# Patient Record
Sex: FEMALE | Race: ASIAN | NOT HISPANIC OR LATINO | URBAN - METROPOLITAN AREA
[De-identification: names, ages, dates, MRNs, and addresses within clinical notes are randomized per-mention and may not be internally consistent; named-entity substitution may affect disease eponyms.]

---

## 2017-03-18 ENCOUNTER — EMERGENCY (EMERGENCY)
Facility: HOSPITAL | Age: 82
LOS: 1 days | Discharge: HOME | End: 2017-03-18
Attending: EMERGENCY MEDICINE
Payer: MEDICARE

## 2017-03-18 VITALS
DIASTOLIC BLOOD PRESSURE: 52 MMHG | HEART RATE: 73 BPM | RESPIRATION RATE: 16 BRPM | HEIGHT: 61.42 IN | SYSTOLIC BLOOD PRESSURE: 127 MMHG | TEMPERATURE: 98 F | WEIGHT: 110.01 LBS | OXYGEN SATURATION: 98 %

## 2017-03-18 VITALS
OXYGEN SATURATION: 100 % | HEART RATE: 81 BPM | SYSTOLIC BLOOD PRESSURE: 151 MMHG | TEMPERATURE: 97 F | RESPIRATION RATE: 16 BRPM | DIASTOLIC BLOOD PRESSURE: 70 MMHG

## 2017-03-18 DIAGNOSIS — M19.90 UNSPECIFIED OSTEOARTHRITIS, UNSPECIFIED SITE: ICD-10-CM

## 2017-03-18 DIAGNOSIS — I10 ESSENTIAL (PRIMARY) HYPERTENSION: ICD-10-CM

## 2017-03-18 DIAGNOSIS — R21 RASH AND OTHER NONSPECIFIC SKIN ERUPTION: ICD-10-CM

## 2017-03-18 PROCEDURE — 99283 EMERGENCY DEPT VISIT LOW MDM: CPT

## 2017-03-18 RX ORDER — DIPHENHYDRAMINE HCL 50 MG
25 CAPSULE ORAL ONCE
Qty: 0 | Refills: 0 | Status: COMPLETED | OUTPATIENT
Start: 2017-03-18 | End: 2017-03-18

## 2017-03-18 RX ORDER — FAMOTIDINE 10 MG/ML
1 INJECTION INTRAVENOUS
Qty: 14 | Refills: 0
Start: 2017-03-18 | End: 2017-03-25

## 2017-03-18 RX ORDER — DIPHENHYDRAMINE HCL 50 MG
1 CAPSULE ORAL
Qty: 20 | Refills: 0
Start: 2017-03-18 | End: 2017-03-25

## 2017-03-18 RX ORDER — FAMOTIDINE 10 MG/ML
20 INJECTION INTRAVENOUS ONCE
Qty: 0 | Refills: 0 | Status: COMPLETED | OUTPATIENT
Start: 2017-03-18 | End: 2017-03-18

## 2017-03-18 RX ADMIN — FAMOTIDINE 20 MILLIGRAM(S): 10 INJECTION INTRAVENOUS at 11:18

## 2017-03-18 RX ADMIN — Medication 20 MILLIGRAM(S): at 11:18

## 2017-03-18 RX ADMIN — Medication 25 MILLIGRAM(S): at 11:18

## 2017-03-18 NOTE — ED ADULT NURSE NOTE - OBJECTIVE STATEMENT
Pt accompanied by son in law who pt wishes to use for translation, reports dry red itchy scaly rash to body and head x 2 weeks.

## 2017-03-18 NOTE — ED PROVIDER NOTE - OBJECTIVE STATEMENT
81 year old with generalized hives for two weeks, given cream but did not helpt  no new meds or lotions, or soaps. no new foods gets monthly injectsion of arthriris. pt declined  phone, used family member

## 2017-03-22 PROBLEM — Z00.00 ENCOUNTER FOR PREVENTIVE HEALTH EXAMINATION: Status: ACTIVE | Noted: 2017-03-22

## 2018-04-18 ENCOUNTER — EMERGENCY (EMERGENCY)
Facility: HOSPITAL | Age: 83
LOS: 1 days | Discharge: HOME | End: 2018-04-18
Attending: EMERGENCY MEDICINE
Payer: MEDICARE

## 2018-04-18 VITALS
SYSTOLIC BLOOD PRESSURE: 189 MMHG | OXYGEN SATURATION: 95 % | TEMPERATURE: 98 F | HEIGHT: 62 IN | HEART RATE: 75 BPM | DIASTOLIC BLOOD PRESSURE: 100 MMHG | RESPIRATION RATE: 17 BRPM | WEIGHT: 117.95 LBS

## 2018-04-18 VITALS
OXYGEN SATURATION: 96 % | DIASTOLIC BLOOD PRESSURE: 78 MMHG | HEART RATE: 72 BPM | RESPIRATION RATE: 16 BRPM | SYSTOLIC BLOOD PRESSURE: 158 MMHG

## 2018-04-18 PROCEDURE — 73502 X-RAY EXAM HIP UNI 2-3 VIEWS: CPT

## 2018-04-18 PROCEDURE — 99283 EMERGENCY DEPT VISIT LOW MDM: CPT | Mod: 25

## 2018-04-18 PROCEDURE — 99284 EMERGENCY DEPT VISIT MOD MDM: CPT

## 2018-04-18 PROCEDURE — 73502 X-RAY EXAM HIP UNI 2-3 VIEWS: CPT | Mod: 26,LT

## 2018-04-18 RX ORDER — ACETAMINOPHEN 500 MG
650 TABLET ORAL ONCE
Qty: 0 | Refills: 0 | Status: COMPLETED | OUTPATIENT
Start: 2018-04-18 | End: 2018-04-18

## 2018-04-18 RX ADMIN — Medication 650 MILLIGRAM(S): at 13:40

## 2018-04-18 RX ADMIN — Medication 650 MILLIGRAM(S): at 13:10

## 2018-04-18 NOTE — ED PROVIDER NOTE - OBJECTIVE STATEMENT
Sara . 81 y/o F pt with PMHx of Arthritis and HTN and no significant PSHx BIB EMS to ED c/o mild L hip pain x2 days s/p mechanical fall on 04/16/2018. Pt states she had an X-Ray of the L hip performed, which was negative for fracture; pt has been ambulatory with a walker since the mechanical fall. Pt reports she does not want to be in the ED today, and says her insurance company called the ambulance en route to the ED. Pt denies fever, chills, CP, SOB, abdominal pain, focal weakness, paresthesias, LOC, or any other complaints. NKDA.

## 2018-04-18 NOTE — ED PROVIDER NOTE - MEDICAL DECISION MAKING DETAILS
83 y/o F pt presents with L hip pain s/p mechanical fall. No fracture. Will d/c home to f/u with orthopedics in x1 week.

## 2018-04-18 NOTE — ED PROVIDER NOTE - CHPI ED SYMPTOMS NEG
no fever/no LOC, no focal weakness, no paresthesias, no abdominal pain, no shortness of breath, no chest pain, no chills

## 2018-04-18 NOTE — ED PROVIDER NOTE - PROGRESS NOTE DETAILS
Attempted to call pt's daughter and other listed relative; no answer at either number Patient is resting comfortably, NAD. XR negative for fx. Patient asked me to call daughter to come pick her up. Gave multiple phone numbers. I called those numbers but all were incorrect. SEE paged. Patient's daughter in ED. Also seen by SEE, who arranged in-home PT. Daughter reports patient also fell last night, slid out of chair on to left hip again. Insurance did call ambulance. No head injury, LOC, cp, sob.

## 2018-04-18 NOTE — ED ADULT NURSE NOTE - CHPI ED SYMPTOMS NEG
no back pain/no abrasion/no numbness/no difficulty bearing weight/no weakness/no fever/no deformity/no tingling/no bruising

## 2020-03-14 ENCOUNTER — INPATIENT (INPATIENT)
Facility: HOSPITAL | Age: 85
LOS: 2 days | Discharge: HOME | DRG: 378 | End: 2020-03-17
Attending: INTERNAL MEDICINE | Admitting: INTERNAL MEDICINE
Payer: MEDICARE

## 2020-03-14 VITALS
HEIGHT: 60 IN | WEIGHT: 100.09 LBS | OXYGEN SATURATION: 100 % | TEMPERATURE: 98 F | HEART RATE: 70 BPM | SYSTOLIC BLOOD PRESSURE: 125 MMHG | DIASTOLIC BLOOD PRESSURE: 72 MMHG | RESPIRATION RATE: 20 BRPM

## 2020-03-14 DIAGNOSIS — K92.2 GASTROINTESTINAL HEMORRHAGE, UNSPECIFIED: ICD-10-CM

## 2020-03-14 PROBLEM — M19.90 UNSPECIFIED OSTEOARTHRITIS, UNSPECIFIED SITE: Chronic | Status: ACTIVE | Noted: 2017-03-18

## 2020-03-14 PROBLEM — I10 ESSENTIAL (PRIMARY) HYPERTENSION: Chronic | Status: ACTIVE | Noted: 2017-03-18

## 2020-03-14 LAB
ACANTHOCYTES BLD QL SMEAR: SLIGHT — SIGNIFICANT CHANGE UP
ALBUMIN SERPL ELPH-MCNC: 2.3 G/DL — LOW (ref 3.5–5)
ALP SERPL-CCNC: 67 U/L — SIGNIFICANT CHANGE UP (ref 40–120)
ALT FLD-CCNC: 16 U/L DA — SIGNIFICANT CHANGE UP (ref 10–60)
ANION GAP SERPL CALC-SCNC: 6 MMOL/L — SIGNIFICANT CHANGE UP (ref 5–17)
ANISOCYTOSIS BLD QL: SLIGHT — SIGNIFICANT CHANGE UP
AST SERPL-CCNC: 22 U/L — SIGNIFICANT CHANGE UP (ref 10–40)
BASOPHILS # BLD AUTO: 0 K/UL — SIGNIFICANT CHANGE UP (ref 0–0.2)
BASOPHILS NFR BLD AUTO: 0 % — SIGNIFICANT CHANGE UP (ref 0–2)
BILIRUB SERPL-MCNC: 0.2 MG/DL — SIGNIFICANT CHANGE UP (ref 0.2–1.2)
BLD GP AB SCN SERPL QL: SIGNIFICANT CHANGE UP
BUN SERPL-MCNC: 22 MG/DL — HIGH (ref 7–18)
CALCIUM SERPL-MCNC: 8 MG/DL — LOW (ref 8.4–10.5)
CHLORIDE SERPL-SCNC: 108 MMOL/L — SIGNIFICANT CHANGE UP (ref 96–108)
CO2 SERPL-SCNC: 23 MMOL/L — SIGNIFICANT CHANGE UP (ref 22–31)
CREAT SERPL-MCNC: 1.03 MG/DL — SIGNIFICANT CHANGE UP (ref 0.5–1.3)
ELLIPTOCYTES BLD QL SMEAR: SLIGHT — SIGNIFICANT CHANGE UP
EOSINOPHIL # BLD AUTO: 0.31 K/UL — SIGNIFICANT CHANGE UP (ref 0–0.5)
EOSINOPHIL NFR BLD AUTO: 4 % — SIGNIFICANT CHANGE UP (ref 0–6)
GLUCOSE SERPL-MCNC: 95 MG/DL — SIGNIFICANT CHANGE UP (ref 70–99)
HCT VFR BLD CALC: 16.5 % — CRITICAL LOW (ref 34.5–45)
HGB BLD-MCNC: 5.2 G/DL — CRITICAL LOW (ref 11.5–15.5)
HYPOCHROMIA BLD QL: SLIGHT — SIGNIFICANT CHANGE UP
IRON SATN MFR SERPL: 29 UG/DL — LOW (ref 40–150)
IRON SATN MFR SERPL: 8 % — LOW (ref 15–50)
LIDOCAIN IGE QN: 289 U/L — SIGNIFICANT CHANGE UP (ref 73–393)
LYMPHOCYTES # BLD AUTO: 1 K/UL — SIGNIFICANT CHANGE UP (ref 1–3.3)
LYMPHOCYTES # BLD AUTO: 13 % — SIGNIFICANT CHANGE UP (ref 13–44)
MANUAL SMEAR VERIFICATION: SIGNIFICANT CHANGE UP
MCHC RBC-ENTMCNC: 29.2 PG — SIGNIFICANT CHANGE UP (ref 27–34)
MCHC RBC-ENTMCNC: 31.5 GM/DL — LOW (ref 32–36)
MCV RBC AUTO: 92.7 FL — SIGNIFICANT CHANGE UP (ref 80–100)
MICROCYTES BLD QL: SLIGHT — SIGNIFICANT CHANGE UP
MONOCYTES # BLD AUTO: 0.69 K/UL — SIGNIFICANT CHANGE UP (ref 0–0.9)
MONOCYTES NFR BLD AUTO: 9 % — SIGNIFICANT CHANGE UP (ref 2–14)
NEUTROPHILS # BLD AUTO: 5.61 K/UL — SIGNIFICANT CHANGE UP (ref 1.8–7.4)
NEUTROPHILS NFR BLD AUTO: 72 % — SIGNIFICANT CHANGE UP (ref 43–77)
NEUTS BAND # BLD: 1 % — SIGNIFICANT CHANGE UP (ref 0–8)
NRBC # BLD: 0 /100 — SIGNIFICANT CHANGE UP (ref 0–0)
OB PNL STL: POSITIVE
PLAT MORPH BLD: NORMAL — SIGNIFICANT CHANGE UP
PLATELET # BLD AUTO: 272 K/UL — SIGNIFICANT CHANGE UP (ref 150–400)
POIKILOCYTOSIS BLD QL AUTO: SLIGHT — SIGNIFICANT CHANGE UP
POLYCHROMASIA BLD QL SMEAR: SLIGHT — SIGNIFICANT CHANGE UP
POTASSIUM SERPL-MCNC: 3.8 MMOL/L — SIGNIFICANT CHANGE UP (ref 3.5–5.3)
POTASSIUM SERPL-SCNC: 3.8 MMOL/L — SIGNIFICANT CHANGE UP (ref 3.5–5.3)
PROT SERPL-MCNC: 5.7 G/DL — LOW (ref 6–8.3)
RBC # BLD: 1.78 M/UL — LOW (ref 3.8–5.2)
RBC # BLD: 1.83 M/UL — LOW (ref 3.8–5.2)
RBC # FLD: 17.1 % — HIGH (ref 10.3–14.5)
RBC BLD AUTO: ABNORMAL
RETICS #: 80.5 K/UL — SIGNIFICANT CHANGE UP (ref 25–125)
RETICS/RBC NFR: 4.4 % — HIGH (ref 0.5–2.5)
SODIUM SERPL-SCNC: 137 MMOL/L — SIGNIFICANT CHANGE UP (ref 135–145)
SPHEROCYTES BLD QL SMEAR: SLIGHT — SIGNIFICANT CHANGE UP
TIBC SERPL-MCNC: 353 UG/DL — SIGNIFICANT CHANGE UP (ref 250–450)
TROPONIN I SERPL-MCNC: 0.03 NG/ML — SIGNIFICANT CHANGE UP (ref 0–0.04)
UIBC SERPL-MCNC: 324 UG/DL — SIGNIFICANT CHANGE UP (ref 110–370)
VARIANT LYMPHS # BLD: 1 % — SIGNIFICANT CHANGE UP (ref 0–6)
WBC # BLD: 7.69 K/UL — SIGNIFICANT CHANGE UP (ref 3.8–10.5)
WBC # FLD AUTO: 7.69 K/UL — SIGNIFICANT CHANGE UP (ref 3.8–10.5)

## 2020-03-14 PROCEDURE — 99285 EMERGENCY DEPT VISIT HI MDM: CPT

## 2020-03-14 PROCEDURE — 74177 CT ABD & PELVIS W/CONTRAST: CPT | Mod: 26

## 2020-03-14 PROCEDURE — 71045 X-RAY EXAM CHEST 1 VIEW: CPT | Mod: 26

## 2020-03-14 RX ORDER — PREDNISOLONE 5 MG
5 TABLET ORAL
Qty: 0 | Refills: 0 | DISCHARGE

## 2020-03-14 RX ORDER — ACETAMINOPHEN 500 MG
650 TABLET ORAL DAILY
Refills: 0 | Status: DISCONTINUED | OUTPATIENT
Start: 2020-03-14 | End: 2020-03-17

## 2020-03-14 RX ORDER — IOHEXOL 300 MG/ML
30 INJECTION, SOLUTION INTRAVENOUS ONCE
Refills: 0 | Status: COMPLETED | OUTPATIENT
Start: 2020-03-14 | End: 2020-03-14

## 2020-03-14 RX ORDER — FOLIC ACID 0.8 MG
1 TABLET ORAL DAILY
Refills: 0 | Status: DISCONTINUED | OUTPATIENT
Start: 2020-03-14 | End: 2020-03-17

## 2020-03-14 RX ORDER — CARVEDILOL PHOSPHATE 80 MG/1
1 CAPSULE, EXTENDED RELEASE ORAL
Qty: 0 | Refills: 0 | DISCHARGE

## 2020-03-14 RX ORDER — FOLIC ACID 0.8 MG
1 TABLET ORAL
Qty: 0 | Refills: 0 | DISCHARGE

## 2020-03-14 RX ORDER — CARVEDILOL PHOSPHATE 80 MG/1
3.12 CAPSULE, EXTENDED RELEASE ORAL EVERY 12 HOURS
Refills: 0 | Status: DISCONTINUED | OUTPATIENT
Start: 2020-03-14 | End: 2020-03-17

## 2020-03-14 RX ORDER — FUROSEMIDE 40 MG
20 TABLET ORAL ONCE
Refills: 0 | Status: COMPLETED | OUTPATIENT
Start: 2020-03-14 | End: 2020-03-14

## 2020-03-14 RX ORDER — SODIUM CHLORIDE 9 MG/ML
1000 INJECTION INTRAMUSCULAR; INTRAVENOUS; SUBCUTANEOUS ONCE
Refills: 0 | Status: COMPLETED | OUTPATIENT
Start: 2020-03-14 | End: 2020-03-14

## 2020-03-14 RX ORDER — SODIUM CHLORIDE 9 MG/ML
1000 INJECTION INTRAMUSCULAR; INTRAVENOUS; SUBCUTANEOUS
Refills: 0 | Status: DISCONTINUED | OUTPATIENT
Start: 2020-03-14 | End: 2020-03-15

## 2020-03-14 RX ORDER — PANTOPRAZOLE SODIUM 20 MG/1
80 TABLET, DELAYED RELEASE ORAL ONCE
Refills: 0 | Status: COMPLETED | OUTPATIENT
Start: 2020-03-14 | End: 2020-03-14

## 2020-03-14 RX ADMIN — Medication 20 MILLIGRAM(S): at 22:28

## 2020-03-14 RX ADMIN — SODIUM CHLORIDE 1000 MILLILITER(S): 9 INJECTION INTRAMUSCULAR; INTRAVENOUS; SUBCUTANEOUS at 16:45

## 2020-03-14 RX ADMIN — SODIUM CHLORIDE 125 MILLILITER(S): 9 INJECTION INTRAMUSCULAR; INTRAVENOUS; SUBCUTANEOUS at 17:54

## 2020-03-14 RX ADMIN — PANTOPRAZOLE SODIUM 80 MILLIGRAM(S): 20 TABLET, DELAYED RELEASE ORAL at 16:45

## 2020-03-14 RX ADMIN — IOHEXOL 30 MILLILITER(S): 300 INJECTION, SOLUTION INTRAVENOUS at 16:45

## 2020-03-14 NOTE — H&P ADULT - PROBLEM SELECTOR PLAN 1
GI bleed, occult +, not bleeding actively, HHA and daughter denies any blood in stool. Normal abd exam , good bowel sounds  DDx could be diverticular, haemorrhodial, AVM, peptic ulcer? hx iof gastritis ?  BP low to 70/50, s/p 2 L bolus--->improved   Hb 12, baseline 9--10, INR 1.49  Currently Hb stable, c/w fluids   Protonix 40mg iv bid  GI consult   CBC q6, keep Hb>7  NPO except medications  Hold anticoagulants, No chemoprophylaxis for DVT ppx. SCD for now.  Currently hemodynamically stable, BP improved s/p fluids to 93/54 MAP 64, not a candidate for ICU at this time, reconsult if required GI bleed, occult +, not bleeding actively, Grand daughter denies any blood in stool. Normal abd exam , good bowel sounds, pt is passing gas  Pt had BM in ED  DDx could be diverticular, haemorrhodial, AVM, Malignancy vs Eliquis    gastritis ?peptic ulcer? due to Meloxicam, Ibuprofen, Prednisone  CT scan is showing Gastritis vs Mass  Hb 5.9 s/p 2 units PRBC and 1 L of NS in ED   Currently Hb stable,   Started on Protonix 40mg iv bid  CBC q8, keep Hb>7  NPO except medications  Hold anticoagulants like Eliquis for now, No chemoprophylaxis for DVT ppx. SCD for now.  Avoid NSAIDS like Ketorolac and give tylenol for pain.  GI consult

## 2020-03-14 NOTE — ED PROVIDER NOTE - CLINICAL SUMMARY MEDICAL DECISION MAKING FREE TEXT BOX
Labs drawn     pt with bowel habit changes, stool appears to be dark, will get labs, CT A/P, reassess

## 2020-03-14 NOTE — ED PROVIDER NOTE - OBJECTIVE STATEMENT
84 y.o. female with h/o RA, pt's currently on Prednisone 5mg/day, pt c/o no BM for 3 days, no BM for 24 hrs., pt normally has BM daily, last colonoscopy neg @ age 81.  decreased appetite, pt's passing gas, No n/v, fever, abd pain

## 2020-03-14 NOTE — H&P ADULT - NSHPLABSRESULTS_GEN_ALL_CORE
LABS:                        5.2    7.69  )-----------( 272      ( 14 Mar 2020 17:00 )             16.5     03-14    137  |  108  |  22<H>  ----------------------------<  95  3.8   |  23  |  1.03    Ca    8.0<L>      14 Mar 2020 17:00    TPro  5.7<L>  /  Alb  2.3<L>  /  TBili  0.2  /  DBili  x   /  AST  22  /  ALT  16  /  AlkPhos  67  03-14        LIVER FUNCTIONS - ( 14 Mar 2020 17:00 )  Alb: 2.3 g/dL / Pro: 5.7 g/dL / ALK PHOS: 67 U/L / ALT: 16 U/L DA / AST: 22 U/L / GGT: x           Lipase, Serum: 289 U/L (03-14-20 @ 17:00)

## 2020-03-14 NOTE — H&P ADULT - ASSESSMENT
83 y/o.F Cantonese speaking, from home, ambulates with walker, lives with family with PMH of Dementia, RA, Afib (Eliquis), HTN presented to ED with no BM and not urinating since 3 days.  In ED vitals are stable  Labs are significant for Hb 5.2  FOBT Positive  In ED s/p 2 units of PRBC

## 2020-03-14 NOTE — H&P ADULT - HISTORY OF PRESENT ILLNESS
84 y.o.F Cantonese speaking, from home, ambulates with walker, lives with family with PMH of Dementia, RA, Afib (Eliquis), HTN presented to ED with no BM and not urinating since 3 days. Pt normally has BM daily. Spoke to the Grand daughter on phone.  Pt is not speaking much, has waxing and waning mentation. Pt is c/o Heart burn, abdominal pain, not eating, loss of appetite, weight loss of 10 pounds in a month. Pt had a fall last month and hurt her Rt leg. Her last colonoscopy was 10 yrs back and was normal.  Pt is taking Meloxicam, Ibuprofen for arthritic pain. Pt had Black colored stools, jet black, watery stools 3 weeks ago and was taking Ranitidine, Omeprazole which gave her relief and bleeding stopped. Pt was taking Amoxicillin for last month for unknown reason. Pt is not taking prednisone from January 2020 for RA. Patient denies cough, sputum production, fevers/chills/nausea/vomiting. No diarrhea.  GOC Full code

## 2020-03-14 NOTE — H&P ADULT - NSHPPHYSICALEXAM_GEN_ALL_CORE
Vital Signs Last 24 Hrs  T(C): 36.4 (14 Mar 2020 23:39), Max: 36.8 (14 Mar 2020 15:20)  T(F): 97.5 (14 Mar 2020 23:39), Max: 98.3 (14 Mar 2020 15:20)  HR: 63 (14 Mar 2020 23:39) (63 - 70)  BP: 141/74 (14 Mar 2020 23:39) (125/72 - 141/74)  BP(mean): --  RR: 17 (14 Mar 2020 23:39) (17 - 20)  SpO2: 98% (14 Mar 2020 23:39) (98% - 100%)  PHYSICAL EXAM:  GENERAL: NAD,  HEAD:  Atraumatic, Normocephalic  EYES:  conjunctiva and sclera clear  NECK: Supple, No JVD, Normal thyroid  CHEST/LUNG: Clear to auscultation. Clear to percussion bilaterally; No rales, rhonchi, wheezing, or rubs  HEART: Regular rate and rhythm; No murmurs, rubs, or gallops  ABDOMEN: Soft, Nontender, Nondistended; Bowel sounds present  NERVOUS SYSTEM:  Alert & Oriented X 2    EXTREMITIES:  2+ Peripheral Pulses, No clubbing, cyanosis, or edema  SKIN: warm dry

## 2020-03-14 NOTE — ED PROVIDER NOTE - MUSCULOSKELETAL, MLM
Spine appears kyphotic, range of motion is not limited, no muscle or joint tenderness, debra hands with deformity

## 2020-03-14 NOTE — H&P ADULT - PROBLEM SELECTOR PLAN 3
Pt was on Amlodipine 5mg but it is on hold as she is normotensive.  She is receiving 2 PRBC and IVF  Resume if needed.

## 2020-03-14 NOTE — H&P ADULT - PROBLEM SELECTOR PLAN 2
EKG showed showed Afib with rate controlled  A fib: Rate controlled on carvedilol, hold  Eliquis  CHADS2-VASc score is 4 (HTN +1, age+2, female 1), consistent with >4.8% annual risk of stroke if off systemic anticoagulation  ATRIA stroke risk 7, high risk for ischemic stroke  ATRIA Bleeding risk 6 high risk of bleeding  - Not starting on Anticoagulation as the pt is at risk of falls and GI Bleed   Tele monitoring,   Troponins on admission , f/u T2 and T3  f/u ECHO  c/w Carvedilol her home medication   Cardiology Consulted Dr. Doe

## 2020-03-14 NOTE — ED PROVIDER NOTE - CARE PLAN
Principal Discharge DX:	UGIB (upper gastrointestinal bleed) Principal Discharge DX:	UGIB (upper gastrointestinal bleed)  Secondary Diagnosis:	New onset atrial fibrillation

## 2020-03-14 NOTE — ED PROVIDER NOTE - PROGRESS NOTE DETAILS
pt with UGIB, will admit, case d/w Dr. BLAYNE Cavazos, will transfuse pt bladder scan done-17cc, no urinary retention, pt with UGIB, will admit, case d/w Dr. BLAYNE Cavazos, will transfuse pt pt with GIB, will not give anticoag. for A. fib

## 2020-03-15 DIAGNOSIS — M06.9 RHEUMATOID ARTHRITIS, UNSPECIFIED: ICD-10-CM

## 2020-03-15 DIAGNOSIS — I10 ESSENTIAL (PRIMARY) HYPERTENSION: ICD-10-CM

## 2020-03-15 DIAGNOSIS — M19.90 UNSPECIFIED OSTEOARTHRITIS, UNSPECIFIED SITE: ICD-10-CM

## 2020-03-15 DIAGNOSIS — E46 UNSPECIFIED PROTEIN-CALORIE MALNUTRITION: ICD-10-CM

## 2020-03-15 DIAGNOSIS — K92.2 GASTROINTESTINAL HEMORRHAGE, UNSPECIFIED: ICD-10-CM

## 2020-03-15 DIAGNOSIS — I48.91 UNSPECIFIED ATRIAL FIBRILLATION: ICD-10-CM

## 2020-03-15 DIAGNOSIS — Z29.9 ENCOUNTER FOR PROPHYLACTIC MEASURES, UNSPECIFIED: ICD-10-CM

## 2020-03-15 LAB
ALBUMIN SERPL ELPH-MCNC: 2.5 G/DL — LOW (ref 3.5–5)
ALP SERPL-CCNC: 74 U/L — SIGNIFICANT CHANGE UP (ref 40–120)
ALT FLD-CCNC: 22 U/L DA — SIGNIFICANT CHANGE UP (ref 10–60)
ANION GAP SERPL CALC-SCNC: 7 MMOL/L — SIGNIFICANT CHANGE UP (ref 5–17)
AST SERPL-CCNC: 27 U/L — SIGNIFICANT CHANGE UP (ref 10–40)
BASOPHILS # BLD AUTO: 0.04 K/UL — SIGNIFICANT CHANGE UP (ref 0–0.2)
BASOPHILS # BLD AUTO: 0.05 K/UL — SIGNIFICANT CHANGE UP (ref 0–0.2)
BASOPHILS # BLD AUTO: 0.05 K/UL — SIGNIFICANT CHANGE UP (ref 0–0.2)
BASOPHILS NFR BLD AUTO: 0.5 % — SIGNIFICANT CHANGE UP (ref 0–2)
BASOPHILS NFR BLD AUTO: 0.6 % — SIGNIFICANT CHANGE UP (ref 0–2)
BASOPHILS NFR BLD AUTO: 0.7 % — SIGNIFICANT CHANGE UP (ref 0–2)
BILIRUB SERPL-MCNC: 0.7 MG/DL — SIGNIFICANT CHANGE UP (ref 0.2–1.2)
BUN SERPL-MCNC: 12 MG/DL — SIGNIFICANT CHANGE UP (ref 7–18)
CALCIUM SERPL-MCNC: 7.7 MG/DL — LOW (ref 8.4–10.5)
CHLORIDE SERPL-SCNC: 108 MMOL/L — SIGNIFICANT CHANGE UP (ref 96–108)
CHOLEST SERPL-MCNC: 162 MG/DL — SIGNIFICANT CHANGE UP (ref 10–199)
CK MB BLD-MCNC: 2.4 % — SIGNIFICANT CHANGE UP (ref 0–3.5)
CK MB CFR SERPL CALC: 1.5 NG/ML — SIGNIFICANT CHANGE UP (ref 0–3.6)
CK SERPL-CCNC: 62 U/L — SIGNIFICANT CHANGE UP (ref 21–215)
CO2 SERPL-SCNC: 25 MMOL/L — SIGNIFICANT CHANGE UP (ref 22–31)
CREAT SERPL-MCNC: 0.86 MG/DL — SIGNIFICANT CHANGE UP (ref 0.5–1.3)
EOSINOPHIL # BLD AUTO: 0.19 K/UL — SIGNIFICANT CHANGE UP (ref 0–0.5)
EOSINOPHIL # BLD AUTO: 0.33 K/UL — SIGNIFICANT CHANGE UP (ref 0–0.5)
EOSINOPHIL # BLD AUTO: 0.4 K/UL — SIGNIFICANT CHANGE UP (ref 0–0.5)
EOSINOPHIL NFR BLD AUTO: 2.6 % — SIGNIFICANT CHANGE UP (ref 0–6)
EOSINOPHIL NFR BLD AUTO: 4.4 % — SIGNIFICANT CHANGE UP (ref 0–6)
EOSINOPHIL NFR BLD AUTO: 4.5 % — SIGNIFICANT CHANGE UP (ref 0–6)
ERYTHROCYTE [SEDIMENTATION RATE] IN BLOOD: 29 MM/HR — HIGH (ref 0–20)
FERRITIN SERPL-MCNC: 64 NG/ML — SIGNIFICANT CHANGE UP (ref 15–150)
FERRITIN SERPL-MCNC: 65 NG/ML — SIGNIFICANT CHANGE UP (ref 15–150)
FOLATE SERPL-MCNC: >20 NG/ML — SIGNIFICANT CHANGE UP
FOLATE SERPL-MCNC: >20 NG/ML — SIGNIFICANT CHANGE UP
GLUCOSE SERPL-MCNC: 83 MG/DL — SIGNIFICANT CHANGE UP (ref 70–99)
HAPTOGLOB SERPL-MCNC: 140 MG/DL — SIGNIFICANT CHANGE UP (ref 34–200)
HBA1C BLD-MCNC: 5.5 % — SIGNIFICANT CHANGE UP (ref 4–5.6)
HCT VFR BLD CALC: 28.7 % — LOW (ref 34.5–45)
HCT VFR BLD CALC: 29.7 % — LOW (ref 34.5–45)
HCT VFR BLD CALC: 30.2 % — LOW (ref 34.5–45)
HDLC SERPL-MCNC: 51 MG/DL — SIGNIFICANT CHANGE UP
HGB BLD-MCNC: 10 G/DL — LOW (ref 11.5–15.5)
HGB BLD-MCNC: 10.2 G/DL — LOW (ref 11.5–15.5)
HGB BLD-MCNC: 9.8 G/DL — LOW (ref 11.5–15.5)
IMM GRANULOCYTES NFR BLD AUTO: 0.8 % — SIGNIFICANT CHANGE UP (ref 0–1.5)
IMM GRANULOCYTES NFR BLD AUTO: 0.8 % — SIGNIFICANT CHANGE UP (ref 0–1.5)
IMM GRANULOCYTES NFR BLD AUTO: 0.9 % — SIGNIFICANT CHANGE UP (ref 0–1.5)
INR BLD: 1.13 RATIO — SIGNIFICANT CHANGE UP (ref 0.88–1.16)
LIPID PNL WITH DIRECT LDL SERPL: 83 MG/DL — SIGNIFICANT CHANGE UP
LYMPHOCYTES # BLD AUTO: 0.43 K/UL — LOW (ref 1–3.3)
LYMPHOCYTES # BLD AUTO: 0.44 K/UL — LOW (ref 1–3.3)
LYMPHOCYTES # BLD AUTO: 0.5 K/UL — LOW (ref 1–3.3)
LYMPHOCYTES # BLD AUTO: 5.7 % — LOW (ref 13–44)
LYMPHOCYTES # BLD AUTO: 5.7 % — LOW (ref 13–44)
LYMPHOCYTES # BLD AUTO: 6 % — LOW (ref 13–44)
MAGNESIUM SERPL-MCNC: 2.1 MG/DL — SIGNIFICANT CHANGE UP (ref 1.6–2.6)
MCHC RBC-ENTMCNC: 29.2 PG — SIGNIFICANT CHANGE UP (ref 27–34)
MCHC RBC-ENTMCNC: 29.3 PG — SIGNIFICANT CHANGE UP (ref 27–34)
MCHC RBC-ENTMCNC: 29.5 PG — SIGNIFICANT CHANGE UP (ref 27–34)
MCHC RBC-ENTMCNC: 33.7 GM/DL — SIGNIFICANT CHANGE UP (ref 32–36)
MCHC RBC-ENTMCNC: 33.8 GM/DL — SIGNIFICANT CHANGE UP (ref 32–36)
MCHC RBC-ENTMCNC: 34.1 GM/DL — SIGNIFICANT CHANGE UP (ref 32–36)
MCV RBC AUTO: 86.4 FL — SIGNIFICANT CHANGE UP (ref 80–100)
MCV RBC AUTO: 86.5 FL — SIGNIFICANT CHANGE UP (ref 80–100)
MCV RBC AUTO: 87.1 FL — SIGNIFICANT CHANGE UP (ref 80–100)
MONOCYTES # BLD AUTO: 0.64 K/UL — SIGNIFICANT CHANGE UP (ref 0–0.9)
MONOCYTES # BLD AUTO: 0.79 K/UL — SIGNIFICANT CHANGE UP (ref 0–0.9)
MONOCYTES # BLD AUTO: 0.82 K/UL — SIGNIFICANT CHANGE UP (ref 0–0.9)
MONOCYTES NFR BLD AUTO: 10.5 % — SIGNIFICANT CHANGE UP (ref 2–14)
MONOCYTES NFR BLD AUTO: 8.7 % — SIGNIFICANT CHANGE UP (ref 2–14)
MONOCYTES NFR BLD AUTO: 9.3 % — SIGNIFICANT CHANGE UP (ref 2–14)
NEUTROPHILS # BLD AUTO: 5.87 K/UL — SIGNIFICANT CHANGE UP (ref 1.8–7.4)
NEUTROPHILS # BLD AUTO: 5.95 K/UL — SIGNIFICANT CHANGE UP (ref 1.8–7.4)
NEUTROPHILS # BLD AUTO: 7 K/UL — SIGNIFICANT CHANGE UP (ref 1.8–7.4)
NEUTROPHILS NFR BLD AUTO: 78 % — HIGH (ref 43–77)
NEUTROPHILS NFR BLD AUTO: 79.1 % — HIGH (ref 43–77)
NEUTROPHILS NFR BLD AUTO: 81.2 % — HIGH (ref 43–77)
NRBC # BLD: 0 /100 WBCS — SIGNIFICANT CHANGE UP (ref 0–0)
PHOSPHATE SERPL-MCNC: 3 MG/DL — SIGNIFICANT CHANGE UP (ref 2.5–4.5)
PLATELET # BLD AUTO: 244 K/UL — SIGNIFICANT CHANGE UP (ref 150–400)
PLATELET # BLD AUTO: 255 K/UL — SIGNIFICANT CHANGE UP (ref 150–400)
PLATELET # BLD AUTO: 256 K/UL — SIGNIFICANT CHANGE UP (ref 150–400)
POTASSIUM SERPL-MCNC: 3 MMOL/L — LOW (ref 3.5–5.3)
POTASSIUM SERPL-SCNC: 3 MMOL/L — LOW (ref 3.5–5.3)
PROT SERPL-MCNC: 6 G/DL — SIGNIFICANT CHANGE UP (ref 6–8.3)
PROTHROM AB SERPL-ACNC: 12.8 SEC — SIGNIFICANT CHANGE UP (ref 10–12.9)
RBC # BLD: 3.32 M/UL — LOW (ref 3.8–5.2)
RBC # BLD: 3.41 M/UL — LOW (ref 3.8–5.2)
RBC # BLD: 3.49 M/UL — LOW (ref 3.8–5.2)
RBC # FLD: 15.2 % — HIGH (ref 10.3–14.5)
RBC # FLD: 15.9 % — HIGH (ref 10.3–14.5)
RBC # FLD: 16.1 % — HIGH (ref 10.3–14.5)
SODIUM SERPL-SCNC: 140 MMOL/L — SIGNIFICANT CHANGE UP (ref 135–145)
TOTAL CHOLESTEROL/HDL RATIO MEASUREMENT: 3.2 RATIO — LOW (ref 3.3–7.1)
TRANSFERRIN SERPL-MCNC: 249 MG/DL — SIGNIFICANT CHANGE UP (ref 200–360)
TRIGL SERPL-MCNC: 140 MG/DL — SIGNIFICANT CHANGE UP (ref 10–149)
TROPONIN I SERPL-MCNC: 0.06 NG/ML — HIGH (ref 0–0.04)
TSH SERPL-MCNC: 1.45 UU/ML — SIGNIFICANT CHANGE UP (ref 0.34–4.82)
VIT B12 SERPL-MCNC: 1953 PG/ML — HIGH (ref 232–1245)
VIT B12 SERPL-MCNC: 1998 PG/ML — HIGH (ref 232–1245)
WBC # BLD: 7.33 K/UL — SIGNIFICANT CHANGE UP (ref 3.8–10.5)
WBC # BLD: 7.53 K/UL — SIGNIFICANT CHANGE UP (ref 3.8–10.5)
WBC # BLD: 8.84 K/UL — SIGNIFICANT CHANGE UP (ref 3.8–10.5)
WBC # FLD AUTO: 7.33 K/UL — SIGNIFICANT CHANGE UP (ref 3.8–10.5)
WBC # FLD AUTO: 7.53 K/UL — SIGNIFICANT CHANGE UP (ref 3.8–10.5)
WBC # FLD AUTO: 8.84 K/UL — SIGNIFICANT CHANGE UP (ref 3.8–10.5)

## 2020-03-15 PROCEDURE — 70450 CT HEAD/BRAIN W/O DYE: CPT | Mod: 26

## 2020-03-15 RX ORDER — PANTOPRAZOLE SODIUM 20 MG/1
40 TABLET, DELAYED RELEASE ORAL
Refills: 0 | Status: DISCONTINUED | OUTPATIENT
Start: 2020-03-15 | End: 2020-03-15

## 2020-03-15 RX ORDER — POTASSIUM CHLORIDE 20 MEQ
10 PACKET (EA) ORAL
Refills: 0 | Status: COMPLETED | OUTPATIENT
Start: 2020-03-15 | End: 2020-03-15

## 2020-03-15 RX ORDER — PANTOPRAZOLE SODIUM 20 MG/1
8 TABLET, DELAYED RELEASE ORAL
Qty: 80 | Refills: 0 | Status: DISCONTINUED | OUTPATIENT
Start: 2020-03-15 | End: 2020-03-16

## 2020-03-15 RX ADMIN — CARVEDILOL PHOSPHATE 3.12 MILLIGRAM(S): 80 CAPSULE, EXTENDED RELEASE ORAL at 18:13

## 2020-03-15 RX ADMIN — PANTOPRAZOLE SODIUM 10 MG/HR: 20 TABLET, DELAYED RELEASE ORAL at 09:03

## 2020-03-15 RX ADMIN — CARVEDILOL PHOSPHATE 3.12 MILLIGRAM(S): 80 CAPSULE, EXTENDED RELEASE ORAL at 05:09

## 2020-03-15 RX ADMIN — PANTOPRAZOLE SODIUM 40 MILLIGRAM(S): 20 TABLET, DELAYED RELEASE ORAL at 05:11

## 2020-03-15 RX ADMIN — Medication 1 MILLIGRAM(S): at 11:30

## 2020-03-15 RX ADMIN — Medication 100 MILLIEQUIVALENT(S): at 14:00

## 2020-03-15 RX ADMIN — Medication 100 MILLIEQUIVALENT(S): at 15:11

## 2020-03-15 RX ADMIN — Medication 100 MILLIEQUIVALENT(S): at 12:45

## 2020-03-15 NOTE — PROGRESS NOTE ADULT - PROBLEM SELECTOR PLAN 1
GI bleed, occult +, not bleeding actively, Grand daughter denies any blood in stool. Normal abd exam , good bowel sounds, pt is passing gas  Pt had BM in ED  DDx could be diverticular, haemorrhodial, AVM, Malignancy vs Eliquis    gastritis ?peptic ulcer? due to Meloxicam, Ibuprofen, Prednisone  CT scan is showing Gastritis vs Mass  Hb 5.9 s/p 2 units PRBC and 1 L of NS in ED   Currently Hb stable,   Started on Protonix 40mg iv bid  CBC q8, keep Hb>7  Hold anticoagulants like Eliquis for now, No chemoprophylaxis for DVT ppx. SCD for now.  Avoid NSAIDS like Ketorolac and give tylenol for pain.  EGD tomorrow  npo after midnight  clear diet for now

## 2020-03-15 NOTE — PHYSICAL THERAPY INITIAL EVALUATION ADULT - ADDITIONAL COMMENTS
Patient lives in an apartment building with 3 steps in lobby which is not a concern due to presence of levator and ramp. Pt uses cane for house ambulation and walker/rollator in the community. Pt states she goes to her adult day care almost every day.

## 2020-03-15 NOTE — CONSULT NOTE ADULT - ASSESSMENT
UGIB   On NSAIDS  Clears today   NPO post midnight for EGD tomorrow   Monitor CBC   Keep hemoglobin around 8   PPI IV Pushes BID   Advanced care planning was discussed with patient and family.  Advanced care planning forms were reviewed and discussed.  Risks, benefits and alternatives of gastroenterologic procedures were discussed in detail and all questions were answered.

## 2020-03-15 NOTE — PHYSICAL THERAPY INITIAL EVALUATION ADULT - GENERAL OBSERVATIONS, REHAB EVAL
Patient was seen for PT evaluation today. EMR, laboratory and radiology results reviewed. Pt received supine in bed, NAD, IV on right hand in situ and nurse administering medication

## 2020-03-15 NOTE — PHYSICAL THERAPY INITIAL EVALUATION ADULT - CRITERIA FOR SKILLED THERAPEUTIC INTERVENTIONS
therapy frequency/anticipated discharge recommendation/risk reduction/prevention/rehab potential/predicted duration of therapy intervention/impairments found/functional limitations in following categories

## 2020-03-15 NOTE — PHYSICAL THERAPY INITIAL EVALUATION ADULT - DIAGNOSIS, PT EVAL
Patient with functional limitation due to decrease strength, decrease ambulation and OOB activities secondary to stomach pain and NPO

## 2020-03-15 NOTE — PHYSICAL THERAPY INITIAL EVALUATION ADULT - PERTINENT HX OF CURRENT PROBLEM, REHAB EVAL
84 y.o.F, home, ambulates with walker, lives with family with PMH of Dementia, RA, Afib (Eliquis), HTN. Admitted for gastrointestinal hemorrhage

## 2020-03-15 NOTE — CONSULT NOTE ADULT - SUBJECTIVE AND OBJECTIVE BOX
Patient is a 84y old  Female who presents with a chief complaint of Bleeding in stools. (14 Mar 2020 22:19)    84 y.o.F Cantonese speaking, from home, ambulates with walker, lives with family with PMH of Dementia, RA, Afib (Eliquis), HTN presented to ED with no BM and not urinating since 3 days. Pt normally has BM daily. Spoke to the Grand daughter on phone.  Pt is not speaking much, has waxing and waning mentation. Pt is c/o Heart burn, abdominal pain, not eating, loss of appetite, weight loss of 10 pounds in a month. Pt had a fall last month and hurt her Rt leg. Her last colonoscopy was 10 yrs back and was normal.  Pt is taking Meloxicam, Ibuprofen for arthritic pain. Pt had Black colored stools, jet black, watery stools 3 weeks ago and was taking Ranitidine, Omeprazole which gave her relief and bleeding stopped. Pt was taking Amoxicillin for last month for unknown reason. Pt is not taking prednisone from 2020 for RA. Patient denies cough, sputum production, fevers/chills/nausea/vomiting. No diarrhea.    REVIEW OF SYSTEMS  Constitutional:   No fever, no fatigue, no pallor, no night sweats, no weight loss.  HEENT:   No eye pain, no vision changes, no icterus, no mouth ulcers.  Respiratory:   No shortness of breath, no cough, no respiratory distress.   Cardiovascular:   No chest pain, no palpitations.   Gastrointestinal: No abdominal pain, no nausea, no vomiting , no diahrrea, no constipation, no hematochezia,no melena.  Skin:   No rashes, no jaundice, no eczema.   Musculoskeletal:   No joint pain, no swelling, no myalgia.   Neurologic:   No headache, no seizure, no weakness.   Genitourinary:   No dysuria, no decreased urine output.  Psychiatric:  No depression, no anxiety,   Endocrine:   No thyroid disease, no diabetes.  Heme/Lymphatic:   No anemia, no blood transfusions, no lymph node enlargement, no bleeding, no bruising.  ___________________________________________________________________________________________  Allergies    No Known Allergies    Intolerances      MEDICATIONS  (STANDING):  carvedilol 3.125 milliGRAM(s) Oral every 12 hours  folic acid 1 milliGRAM(s) Oral daily  pantoprazole Infusion 8 mG/Hr (10 mL/Hr) IV Continuous <Continuous>  potassium chloride  10 mEq/100 mL IVPB 10 milliEquivalent(s) IV Intermittent every 1 hour    MEDICATIONS  (PRN):  acetaminophen   Tablet .. 650 milliGRAM(s) Oral daily PRN Mild Pain (1 - 3)      PAST MEDICAL & SURGICAL HISTORY:  Atrial fibrillation  Rheumatoid arthritis  Hypertension  Arthritis  No significant past surgical history    FAMILY HISTORY:    Social History: No hsitory of : Tobacco use, IVDA, EToH  ______________________________________________________________________________________    PHYSICAL EXAM    Daily Height in cm: 152.4 (14 Mar 2020 15:20)    Daily Weight in k.2 (15 Mar 2020 04:56)  BMI: 19.5 (03-14 @ 15:20)  Change in Weight:  Vital Signs Last 24 Hrs  T(C): 36.8 (15 Mar 2020 04:56), Max: 36.8 (14 Mar 2020 15:20)  T(F): 98.2 (15 Mar 2020 04:56), Max: 98.3 (14 Mar 2020 15:20)  HR: 52 (15 Mar 2020 12:11) (52 - 81)  BP: 128/62 (15 Mar 2020 12:11) (118/66 - 141/74)  BP(mean): --  RR: 17 (15 Mar 2020 10:36) (17 - 20)  SpO2: 98% (15 Mar 2020 12:11) (96% - 100%)    General:  Well developed, well nourished, alert and active, no pallor, NAD.  HEENT:    Normal appearance of conjunctiva, ears, nose, lips, oropharynx, and oral mucosa, anicteric.  Neck:  No masses, no asymmetry.  Lymph Nodes:  No lymphadenopathy.   Cardiovascular:  RRR normal S1/S2, no murmur.  Respiratory:  CTA B/L, normal respiratory effort.   Abdominal:   soft, no masses or tenderness, normoactive BS, NT/ND, no HSM.  Extremities:   No clubbing or cyanosis, normal capillary refill, no edema.   Skin:   No rash, jaundice, lesions, eczema.   Musculoskeletal:  No joint swelling, erythema or tenderness.   Neuro: No focal deficits.   Other:   _______________________________________________________________________________________________  Lab Results:                          10.0   7.53  )-----------( 256      ( 15 Mar 2020 08:11 )             29.7     03-15    140  |  108  |  12  ----------------------------<  83  3.0<L>   |  25  |  0.86    Ca    7.7<L>      15 Mar 2020 08:11  Phos  3.0     03-15  Mg     2.1     -15    TPro  6.0  /  Alb  2.5<L>  /  TBili  0.7  /  DBili  x   /  AST  27  /  ALT  22  /  AlkPhos  74  03-15    LIVER FUNCTIONS - ( 15 Mar 2020 08:11 )  Alb: 2.5 g/dL / Pro: 6.0 g/dL / ALK PHOS: 74 U/L / ALT: 22 U/L DA / AST: 27 U/L / GGT: x           PT/INR - ( 15 Mar 2020 08:11 )   PT: 12.8 sec;   INR: 1.13 ratio           Triglycerides, Serum: 140 mg/dL (03-15 @ 08:11)  Sedimentation Rate, Erythrocyte: 29 mm/Hr (03-15 @ 08:11)    CARDIAC MARKERS ( 15 Mar 2020 08:11 )  0.055 ng/mL / x     / 62 U/L / x     / 1.5 ng/mL  CARDIAC MARKERS ( 14 Mar 2020 17:00 )  0.029 ng/mL / x     / x     / x     / x          Stool Results:          RADIOLOGY RESULTS:    SURGICAL PATHOLOGY:

## 2020-03-15 NOTE — PROGRESS NOTE ADULT - SUBJECTIVE AND OBJECTIVE BOX
PGY 1 Note discussed with supervising resident and primary attending    Patient is a 84y old  Female who presents with a chief complaint of Bleeding in stools. (15 Mar 2020 13:54)      INTERVAL HPI/OVERNIGHT EVENTS: offers no new complaints; current symptoms resolving    MEDICATIONS  (STANDING):  carvedilol 3.125 milliGRAM(s) Oral every 12 hours  folic acid 1 milliGRAM(s) Oral daily  pantoprazole Infusion 8 mG/Hr (10 mL/Hr) IV Continuous <Continuous>    MEDICATIONS  (PRN):  acetaminophen   Tablet .. 650 milliGRAM(s) Oral daily PRN Mild Pain (1 - 3)      __________________________________________________  REVIEW OF SYSTEMS:    CONSTITUTIONAL: No fever,   EYES: no acute visual disturbances  NECK: No pain or stiffness  RESPIRATORY: No cough; No shortness of breath  CARDIOVASCULAR: No chest pain, no palpitations  GASTROINTESTINAL: No pain. No nausea or vomiting; No diarrhea   NEUROLOGICAL: No headache or numbness, no tremors  MUSCULOSKELETAL: No joint pain, no muscle pain  GENITOURINARY: no dysuria, no frequency, no hesitancy  PSYCHIATRY: no depression , no anxiety  ALL OTHER  ROS negative        Vital Signs Last 24 Hrs  T(C): 36.2 (15 Mar 2020 17:39), Max: 36.8 (15 Mar 2020 04:56)  T(F): 97.2 (15 Mar 2020 17:39), Max: 98.2 (15 Mar 2020 04:56)  HR: 58 (15 Mar 2020 17:39) (48 - 81)  BP: 125/65 (15 Mar 2020 17:39) (118/66 - 141/74)  BP(mean): --  RR: 16 (15 Mar 2020 17:39) (16 - 18)  SpO2: 97% (15 Mar 2020 17:39) (96% - 98%)    ________________________________________________  PHYSICAL EXAM:  GENERAL: NAD  HEENT: Normocephalic;  conjunctivae and sclerae clear; moist mucous membranes;   NECK : supple  CHEST/LUNG: Clear to auscultation bilaterally with good air entry   HEART: S1 S2  regular; no murmurs, gallops or rubs  ABDOMEN: Soft, Nontender, Nondistended; Bowel sounds present  EXTREMITIES: no cyanosis; no edema; no calf tenderness  SKIN: warm and dry; no rash  NERVOUS SYSTEM:  Awake and alert; Oriented  to place, person and time ; no new deficits    _________________________________________________  LABS:                        10.2   8.84  )-----------( 255      ( 15 Mar 2020 14:42 )             30.2     03-15    140  |  108  |  12  ----------------------------<  83  3.0<L>   |  25  |  0.86    Ca    7.7<L>      15 Mar 2020 08:11  Phos  3.0     03-15  Mg     2.1     03-15    TPro  6.0  /  Alb  2.5<L>  /  TBili  0.7  /  DBili  x   /  AST  27  /  ALT  22  /  AlkPhos  74  03-15    PT/INR - ( 15 Mar 2020 08:11 )   PT: 12.8 sec;   INR: 1.13 ratio             CAPILLARY BLOOD GLUCOSE            RADIOLOGY & ADDITIONAL TESTS:    Imaging Personally Reviewed:  YES/NO    Consultant(s) Notes Reviewed:   YES/ No    Care Discussed with Consultants :     Plan of care was discussed with patient and /or primary care giver; all questions and concerns were addressed and care was aligned with patient's wishes.

## 2020-03-16 ENCOUNTER — RESULT REVIEW (OUTPATIENT)
Age: 85
End: 2020-03-16

## 2020-03-16 LAB
ALBUMIN SERPL ELPH-MCNC: 2.3 G/DL — LOW (ref 3.5–5)
ALP SERPL-CCNC: 79 U/L — SIGNIFICANT CHANGE UP (ref 40–120)
ALT FLD-CCNC: 20 U/L DA — SIGNIFICANT CHANGE UP (ref 10–60)
ANION GAP SERPL CALC-SCNC: 7 MMOL/L — SIGNIFICANT CHANGE UP (ref 5–17)
AST SERPL-CCNC: 27 U/L — SIGNIFICANT CHANGE UP (ref 10–40)
BASOPHILS # BLD AUTO: 0.05 K/UL — SIGNIFICANT CHANGE UP (ref 0–0.2)
BASOPHILS NFR BLD AUTO: 0.5 % — SIGNIFICANT CHANGE UP (ref 0–2)
BILIRUB SERPL-MCNC: 0.6 MG/DL — SIGNIFICANT CHANGE UP (ref 0.2–1.2)
BUN SERPL-MCNC: 9 MG/DL — SIGNIFICANT CHANGE UP (ref 7–18)
CALCIUM SERPL-MCNC: 7.5 MG/DL — LOW (ref 8.4–10.5)
CHLORIDE SERPL-SCNC: 106 MMOL/L — SIGNIFICANT CHANGE UP (ref 96–108)
CO2 SERPL-SCNC: 24 MMOL/L — SIGNIFICANT CHANGE UP (ref 22–31)
CREAT SERPL-MCNC: 0.69 MG/DL — SIGNIFICANT CHANGE UP (ref 0.5–1.3)
EOSINOPHIL # BLD AUTO: 0.22 K/UL — SIGNIFICANT CHANGE UP (ref 0–0.5)
EOSINOPHIL NFR BLD AUTO: 2.3 % — SIGNIFICANT CHANGE UP (ref 0–6)
GLUCOSE BLDC GLUCOMTR-MCNC: 101 MG/DL — HIGH (ref 70–99)
GLUCOSE BLDC GLUCOMTR-MCNC: 102 MG/DL — HIGH (ref 70–99)
GLUCOSE BLDC GLUCOMTR-MCNC: 114 MG/DL — HIGH (ref 70–99)
GLUCOSE BLDC GLUCOMTR-MCNC: 86 MG/DL — SIGNIFICANT CHANGE UP (ref 70–99)
GLUCOSE BLDC GLUCOMTR-MCNC: 89 MG/DL — SIGNIFICANT CHANGE UP (ref 70–99)
GLUCOSE BLDC GLUCOMTR-MCNC: 90 MG/DL — SIGNIFICANT CHANGE UP (ref 70–99)
GLUCOSE SERPL-MCNC: 78 MG/DL — SIGNIFICANT CHANGE UP (ref 70–99)
HCT VFR BLD CALC: 31 % — LOW (ref 34.5–45)
HGB BLD-MCNC: 10.3 G/DL — LOW (ref 11.5–15.5)
IMM GRANULOCYTES NFR BLD AUTO: 1 % — SIGNIFICANT CHANGE UP (ref 0–1.5)
LYMPHOCYTES # BLD AUTO: 0.54 K/UL — LOW (ref 1–3.3)
LYMPHOCYTES # BLD AUTO: 5.6 % — LOW (ref 13–44)
MAGNESIUM SERPL-MCNC: 2.1 MG/DL — SIGNIFICANT CHANGE UP (ref 1.6–2.6)
MCHC RBC-ENTMCNC: 28.6 PG — SIGNIFICANT CHANGE UP (ref 27–34)
MCHC RBC-ENTMCNC: 33.2 GM/DL — SIGNIFICANT CHANGE UP (ref 32–36)
MCV RBC AUTO: 86.1 FL — SIGNIFICANT CHANGE UP (ref 80–100)
MONOCYTES # BLD AUTO: 0.82 K/UL — SIGNIFICANT CHANGE UP (ref 0–0.9)
MONOCYTES NFR BLD AUTO: 8.6 % — SIGNIFICANT CHANGE UP (ref 2–14)
NEUTROPHILS # BLD AUTO: 7.85 K/UL — HIGH (ref 1.8–7.4)
NEUTROPHILS NFR BLD AUTO: 82 % — HIGH (ref 43–77)
NRBC # BLD: 0 /100 WBCS — SIGNIFICANT CHANGE UP (ref 0–0)
PHOSPHATE SERPL-MCNC: 1.9 MG/DL — LOW (ref 2.5–4.5)
PLATELET # BLD AUTO: 273 K/UL — SIGNIFICANT CHANGE UP (ref 150–400)
POTASSIUM SERPL-MCNC: 3.3 MMOL/L — LOW (ref 3.5–5.3)
POTASSIUM SERPL-SCNC: 3.3 MMOL/L — LOW (ref 3.5–5.3)
PROT SERPL-MCNC: 5.8 G/DL — LOW (ref 6–8.3)
RBC # BLD: 3.6 M/UL — LOW (ref 3.8–5.2)
RBC # FLD: 16.4 % — HIGH (ref 10.3–14.5)
SODIUM SERPL-SCNC: 137 MMOL/L — SIGNIFICANT CHANGE UP (ref 135–145)
TROPONIN I SERPL-MCNC: 0.04 NG/ML — SIGNIFICANT CHANGE UP (ref 0–0.04)
WBC # BLD: 9.58 K/UL — SIGNIFICANT CHANGE UP (ref 3.8–10.5)
WBC # FLD AUTO: 9.58 K/UL — SIGNIFICANT CHANGE UP (ref 3.8–10.5)

## 2020-03-16 PROCEDURE — 88305 TISSUE EXAM BY PATHOLOGIST: CPT | Mod: 26

## 2020-03-16 PROCEDURE — 88312 SPECIAL STAINS GROUP 1: CPT | Mod: 26

## 2020-03-16 RX ORDER — POTASSIUM CHLORIDE 20 MEQ
40 PACKET (EA) ORAL ONCE
Refills: 0 | Status: COMPLETED | OUTPATIENT
Start: 2020-03-16 | End: 2020-03-16

## 2020-03-16 RX ORDER — SODIUM,POTASSIUM PHOSPHATES 278-250MG
1 POWDER IN PACKET (EA) ORAL ONCE
Refills: 0 | Status: COMPLETED | OUTPATIENT
Start: 2020-03-16 | End: 2020-03-16

## 2020-03-16 RX ORDER — PANTOPRAZOLE SODIUM 20 MG/1
40 TABLET, DELAYED RELEASE ORAL
Refills: 0 | Status: DISCONTINUED | OUTPATIENT
Start: 2020-03-16 | End: 2020-03-17

## 2020-03-16 RX ADMIN — Medication 1 MILLIGRAM(S): at 13:45

## 2020-03-16 RX ADMIN — CARVEDILOL PHOSPHATE 3.12 MILLIGRAM(S): 80 CAPSULE, EXTENDED RELEASE ORAL at 18:01

## 2020-03-16 RX ADMIN — Medication 1 PACKET(S): at 09:04

## 2020-03-16 RX ADMIN — CARVEDILOL PHOSPHATE 3.12 MILLIGRAM(S): 80 CAPSULE, EXTENDED RELEASE ORAL at 06:33

## 2020-03-16 RX ADMIN — Medication 40 MILLIEQUIVALENT(S): at 09:04

## 2020-03-16 RX ADMIN — PANTOPRAZOLE SODIUM 40 MILLIGRAM(S): 20 TABLET, DELAYED RELEASE ORAL at 17:59

## 2020-03-16 NOTE — PROGRESS NOTE ADULT - ASSESSMENT
85 y/o.F Cantonese speaking, from home, ambulates with walker, lives with family with PMH of Dementia, RA, Afib (Eliquis), HTN presented to ED with no BM and not urinating since 3 days.  In ED vitals are stable  Labs are significant for Hb 5.2  FOBT Positive  In ED s/p 2 units of PRBC

## 2020-03-16 NOTE — CHART NOTE - NSCHARTNOTEFT_GEN_A_CORE
Upon Nutritional Assessment by the Registered Dietitian your patient was determined to meet criteria / has evidence of the following diagnosis/diagnoses:          [ ]  Mild Protein Calorie Malnutrition        [ X ]  Moderate Protein Calorie Malnutrition        [ ] Severe Protein Calorie Malnutrition        [ ] Unspecified Protein Calorie Malnutrition        [ ] Underweight / BMI <19        [ ] Morbid Obesity / BMI > 40      Findings as based on:  •  Comprehensive nutrition assessment and consultation  •  Calorie counts (nutrient intake analysis)  •  Food acceptance and intake status from observations by staff  •  Follow up  •  Patient education  •  Intervention secondary to interdisciplinary rounds  •   concerns      Treatment:    The following diet has been recommended: Advance diet or consider alternate nutrition support if NPO prolonged further       PROVIDER Section:     By signing this assessment you are acknowledging and agree with the diagnosis/diagnoses assigned by the Registered Dietitian    Comments:

## 2020-03-16 NOTE — DIETITIAN INITIAL EVALUATION ADULT. - OTHER INFO
Pt alert, oriented, native Chinese-speaking RD able to communicate with pt in Chinese, well-communicated, lives home with family, HHA help PTA; decreased intake x 1m, persistent decreasing wt x 2y from usual go=495 lb to 107 lb 3m ago to current sy=069 lb upon admission 3/14/2020; diarrhea x 1d PTA, resolved, denied GI distress, chewing or swallowing problem at present, no specific food choices reported for when diet advanced, NPO/Clear Liquid diet x 2 to 3d in-house, NPO at present pending Endoscopy, s/p GI consult; Discussed with RN

## 2020-03-16 NOTE — CHART NOTE - NSCHARTNOTEFT_GEN_A_CORE
Esophagogastroduodenoscopy Report  Indication: Dysphagia    Referring MD:   Instrument:  #   Anesthesia: MAC  Consent:  Informed consent was obtained from the patient after providing any opportunity for questions  Procedure: The gastroscope was gently passed through the incisoral orifice into the oral cavity and under direct visualization the esophagus was intubated. The endoscope was passed down the esophagus, through the stomach and into proximal duodenum Color, texture, mucosa and anatomy of the esophagus, stomach, and duodenum were carefully examined with the scope. The patient tolerated the procedure well. After completion of the examination, the patient was transferred to the recovery room.   Preparation: NPO   Findings:   Oropharynx	Normal  Esophagus	Normal   EG-junction	Normal appearing z-line.   Cardia	Normal.  Body	Multiple small clean based ulcers. Biopsy taken [2]  Antrum	Large 2.5 cm clean based ulcer with no evidence of bleeding. Biopsy at the edge of the ulcer taken [1]  Pylorus	Normal  Duodenal Bulb	Normal  2nd portion	Normal  3rd portion	Normal  Date and time:3/16/2020 12:01:23 PM  EBL:0  Impression: 1- Large antral ulcer 2- Multiple small ulcers throughout the body     Plan: 1- Resume diet 2- PPI BID 3- Monitor CBC 4- Follow up pathology and treat H. pylori IF positive 5- Repeat EGD in 2-3 months to assess healing .                           Procedure Start Time:     Procedure End Time:                                       Attending:       Reece Poe M.D.   Date and Time: 3/16/2020 12:01:23 PM

## 2020-03-16 NOTE — DIETITIAN INITIAL EVALUATION ADULT. - REASON INDICATOR FOR ASSESSMENT
Nutrition consult requested for significant decrease of oral intake > 5d prior to admit and assessment

## 2020-03-16 NOTE — DIETITIAN INITIAL EVALUATION ADULT. - PERTINENT LABORATORY DATA
03-16 Na137 mmol/L Glu 78 mg/dL K+ 3.3 mmol/L<L> Cr  0.69 mg/dL BUN 9 mg/dL   03-16 Phos 1.9 mg/dL<L>   03-16 Alb 2.3 g/dL<L>     03-15 [id number] 5.5 %   03-15 Chol 162 mg/dL LDL 83 mg/dL HDL 51 mg/dL Trig 140 mg/dL

## 2020-03-16 NOTE — DIETITIAN INITIAL EVALUATION ADULT. - PROBLEM SELECTOR PROBLEM 3
Subjective:      Connor Marie is a 76 y.o. male who was referred by Dr LENY Diaz for evaluation of urothelial cancer.    The patient saw me many years ago at [facility].  These old records are not available at this time.  He tells me that he underwent a right laparoscopic nephroureterectomy by Dr. Balta Street at [facility] in September 2014.  He did very well with the surgery.  He tells me that he developed a recurrence in the bladder postoperatively.  Dr. Starks treated this with resection and induction BCG (6 weekly doses) followed by 2 cycles of 3 dose BCG maintenance.    Since then he has followed up with Dr. Papa Diaz.  He has had 4 positive bladder biopsies of the last year.  During this time.  He had induction with 4 weekly doses of BCG followed by 8 doses of BCG once monthly.    Dr. Diaz called me and is concerned that he may have a high-grade invasive tumor at the bladder neck/prostate.  He is asked me to reevaluate him and consider if additional treatment is necessary.         The following portions of the patient's history were reviewed and updated as appropriate: allergies, current medications, past family history, past medical history, past social history, past surgical history and problem list.    Review of Systems  Constitutional: no fever or chills  ENT: no nasal congestion or sore throat  Respiratory: no cough or shortness of breath  Cardiovascular: no chest pain or palpitations  Gastrointestinal: no nausea or vomiting, tolerating diet  Genitourinary: as per HPI  Hematologic/Lymphatic: no easy bruising or lymphadenopathy  Musculoskeletal: no arthralgias or myalgias  Neurological: no seizures or tremors  Behavioral/Psych: no auditory or visual hallucinations,  History of significant depression.  He was previously on Wellbutrin but this causes aggressiveness.  He also has chronic pain for which she is undergoing injections     Objective:   Vitals: /77 (BP Location: Right arm, Patient  "Position: Sitting, BP Method: Automatic)   Pulse 74   Ht 5' 11" (1.803 m)   Wt 93 kg (205 lb)   BMI 28.59 kg/m²     Physical Exam   General: alert and oriented, no acute distress  Head: normocephalic, atraumatic  Neck: supple, no lymphadenopathy, normal ROM, no masses  Respiratory: clear to auscultation, no wheezes or rales and Symmetric expansion, non-labored breathing  Cardiovascular: regular rate and rhythm, no murmurs, nomal pulses, no peripheral edema  Abdomen:  Well-healed periumbilical extraction site incision and soft, non tender, non distended, no palpable masses, no hernias, no hepatomegaly or splenomegaly  Lymphatic: no inguinal nodes  Skin: normal coloration and turgor, no rashes, no suspicious skin lesions noted  Neuro:  and alert and oriented x3, no gross deficits  Psych: Somewhat emotional and tearful during the examination    Physical Exam    Lab Review   Urinalysis demonstrates nitrite negative positive white cells and positive red blood cells    Pathology  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 Diaz this was at the bladder neck)    May 8, 2017 low-grade TA (per my conversation with Dr. Diaz this was also at the bladder neck    No results found for: WBC, HGB, HCT, MCV, PLT  No results found for: CREATININE, BUN  No results found for: PSA  Imaging  None available for review at this time  Assessment:     1. 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 Abnormal finding in urine       -  Plan:     Orders Placed This Encounter    Urine culture    X-Ray Chest PA And Lateral    POCT URINE DIPSTICK WITHOUT MICROSCOPE     Schedule examination under anesthesia with staging TURP plus minus bladder biopsy and retrograde pyelogram  We discussed the risk and benefits of the above with the patient and I answered all his " questions.    60 min face to face; more than 50% time spent counseling and coordinating care     Hypertension, unspecified type

## 2020-03-16 NOTE — PROGRESS NOTE ADULT - PROBLEM SELECTOR PLAN 2
EKG showed showed Afib with rate controlled  A fib: Rate controlled on carvedilol, hold  Eliquis  CHADS2-VASc score is 4 (HTN +1, age+2, female 1), consistent with >4.8% annual risk of stroke if off systemic anticoagulation  ATRIA stroke risk 7, high risk for ischemic stroke  ATRIA Bleeding risk 6 high risk of bleeding  - Not starting on Anticoagulation as the pt is at risk of falls and GI Bleed   Troponins on admission trended down  c/w Carvedilol her home medication  She is at high risk for bleeding, so AC is being held  echo showed ef 55%. mild pul htn'     Cardiology Consulted Dr. Doe

## 2020-03-16 NOTE — DIETITIAN INITIAL EVALUATION ADULT. - FACTORS AFF FOOD INTAKE
acute on chronic comorbidities/persistent lack of appetite/Temple/ethnic/cultural/personal food preferences

## 2020-03-16 NOTE — DIETITIAN INITIAL EVALUATION ADULT. - NS FNS WEIGHT USED FOR CALC
ideal/Ht=5'    AEI=677 lb   Admission lg=937 lb    BMI=19.3   Current wt=99.6 lb 3/14/2020; 101.6 lb 3/16/2020

## 2020-03-16 NOTE — PROGRESS NOTE ADULT - SUBJECTIVE AND OBJECTIVE BOX
PGY 1 Note discussed with supervising resident and primary attending    Patient is a 84y old  Female who presents with a chief complaint of Bleeding in stools. (15 Mar 2020 17:46)      INTERVAL HPI/OVERNIGHT EVENTS:  Patient had endoscopy and was found to have large antral ulcer, multipple small ulcers in the body. Dr Dejesus recommended for observation now. No AC as she is at high risk for bleeding.      MEDICATIONS  (STANDING):  carvedilol 3.125 milliGRAM(s) Oral every 12 hours  folic acid 1 milliGRAM(s) Oral daily  pantoprazole  Injectable 40 milliGRAM(s) IV Push two times a day    MEDICATIONS  (PRN):  acetaminophen   Tablet .. 650 milliGRAM(s) Oral daily PRN Mild Pain (1 - 3)      __________________________________________________  REVIEW OF SYSTEMS:    CONSTITUTIONAL: No fever,   EYES: no acute visual disturbances  NECK: No pain or stiffness  RESPIRATORY: No cough; No shortness of breath  CARDIOVASCULAR: No chest pain, no palpitations  GASTROINTESTINAL: No pain. No nausea or vomiting; No diarrhea   NEUROLOGICAL: No headache or numbness, no tremors  MUSCULOSKELETAL: No joint pain, no muscle pain  GENITOURINARY: no dysuria, no frequency, no hesitancy  PSYCHIATRY: no depression , no anxiety  ALL OTHER  ROS negative        Vital Signs Last 24 Hrs  T(C): 36.6 (16 Mar 2020 10:46), Max: 37.4 (16 Mar 2020 05:02)  T(F): 97.8 (16 Mar 2020 10:46), Max: 99.3 (16 Mar 2020 05:02)  HR: 60 (16 Mar 2020 11:24) (48 - 93)  BP: 148/80 (16 Mar 2020 11:24) (118/52 - 151/78)  BP(mean): --  RR: 17 (16 Mar 2020 10:46) (16 - 18)  SpO2: 99% (16 Mar 2020 11:24) (95% - 99%)    ________________________________________________  PHYSICAL EXAM:  GENERAL: NAD  HEENT: Normocephalic;  conjunctivae and sclerae clear; moist mucous membranes;   NECK : supple  CHEST/LUNG: Clear to auscultation bilaterally with good air entry   HEART: S1 S2  regular; no murmurs, gallops or rubs  ABDOMEN: Soft, Nontender, Nondistended; Bowel sounds present  EXTREMITIES: no cyanosis; no edema; no calf tenderness  SKIN: warm and dry; no rash  NERVOUS SYSTEM:  Awake and alert; Oriented  to place, person and time ; no new deficits    _________________________________________________  LABS:                        10.3   9.58  )-----------( 273      ( 16 Mar 2020 06:41 )             31.0     03-16    137  |  106  |  9   ----------------------------<  78  3.3<L>   |  24  |  0.69    Ca    7.5<L>      16 Mar 2020 06:41  Phos  1.9     03-16  Mg     2.1     03-16    TPro  5.8<L>  /  Alb  2.3<L>  /  TBili  0.6  /  DBili  x   /  AST  27  /  ALT  20  /  AlkPhos  79  03-16    PT/INR - ( 15 Mar 2020 08:11 )   PT: 12.8 sec;   INR: 1.13 ratio             CAPILLARY BLOOD GLUCOSE      POCT Blood Glucose.: 86 mg/dL (16 Mar 2020 12:46)  POCT Blood Glucose.: 102 mg/dL (16 Mar 2020 08:18)  POCT Blood Glucose.: 90 mg/dL (16 Mar 2020 06:01)  POCT Blood Glucose.: 89 mg/dL (16 Mar 2020 00:30)        RADIOLOGY & ADDITIONAL TESTS:    Imaging Personally Reviewed:  YES/NO    Consultant(s) Notes Reviewed:   YES/ No    Care Discussed with Consultants :     Plan of care was discussed with patient and /or primary care giver; all questions and concerns were addressed and care was aligned with patient's wishes.

## 2020-03-16 NOTE — DIETITIAN INITIAL EVALUATION ADULT. - PERTINENT MEDS FT
MEDICATIONS  (STANDING):  carvedilol 3.125 milliGRAM(s) Oral every 12 hours  folic acid 1 milliGRAM(s) Oral daily  pantoprazole Infusion 8 mG/Hr (10 mL/Hr) IV Continuous <Continuous>  potassium chloride   Powder 40 milliEquivalent(s) Oral once  potassium phosphate / sodium phosphate powder 1 Packet(s) Oral once

## 2020-03-16 NOTE — PROGRESS NOTE ADULT - PROBLEM SELECTOR PLAN 1
GI bleed, occult +, not bleeding actively, Grand daughter denies any blood in stool. Normal abd exam , good bowel sounds, pt is passing gas   Endoscopy was done today which showed large antral ulcer and multiple body ulcers, not actively bleeding. Biopsy was taken.  Dr Dejesus recommended not to start AC as she is at high risk for bleeding.   Started on regular diet  Will observe her for tonight.  f/u cbc

## 2020-03-17 ENCOUNTER — TRANSCRIPTION ENCOUNTER (OUTPATIENT)
Age: 85
End: 2020-03-17

## 2020-03-17 VITALS
OXYGEN SATURATION: 97 % | TEMPERATURE: 98 F | SYSTOLIC BLOOD PRESSURE: 147 MMHG | DIASTOLIC BLOOD PRESSURE: 84 MMHG | RESPIRATION RATE: 18 BRPM | HEART RATE: 55 BPM

## 2020-03-17 LAB
ANION GAP SERPL CALC-SCNC: 5 MMOL/L — SIGNIFICANT CHANGE UP (ref 5–17)
BUN SERPL-MCNC: 7 MG/DL — SIGNIFICANT CHANGE UP (ref 7–18)
CALCIUM SERPL-MCNC: 7.7 MG/DL — LOW (ref 8.4–10.5)
CHLORIDE SERPL-SCNC: 109 MMOL/L — HIGH (ref 96–108)
CO2 SERPL-SCNC: 23 MMOL/L — SIGNIFICANT CHANGE UP (ref 22–31)
CREAT SERPL-MCNC: 0.76 MG/DL — SIGNIFICANT CHANGE UP (ref 0.5–1.3)
GLUCOSE BLDC GLUCOMTR-MCNC: 106 MG/DL — HIGH (ref 70–99)
GLUCOSE BLDC GLUCOMTR-MCNC: 110 MG/DL — HIGH (ref 70–99)
GLUCOSE SERPL-MCNC: 85 MG/DL — SIGNIFICANT CHANGE UP (ref 70–99)
HCT VFR BLD CALC: 31.2 % — LOW (ref 34.5–45)
HGB BLD-MCNC: 10.5 G/DL — LOW (ref 11.5–15.5)
MCHC RBC-ENTMCNC: 29.8 PG — SIGNIFICANT CHANGE UP (ref 27–34)
MCHC RBC-ENTMCNC: 33.7 GM/DL — SIGNIFICANT CHANGE UP (ref 32–36)
MCV RBC AUTO: 88.6 FL — SIGNIFICANT CHANGE UP (ref 80–100)
NRBC # BLD: 0 /100 WBCS — SIGNIFICANT CHANGE UP (ref 0–0)
PLATELET # BLD AUTO: 272 K/UL — SIGNIFICANT CHANGE UP (ref 150–400)
POTASSIUM SERPL-MCNC: 3.8 MMOL/L — SIGNIFICANT CHANGE UP (ref 3.5–5.3)
POTASSIUM SERPL-SCNC: 3.8 MMOL/L — SIGNIFICANT CHANGE UP (ref 3.5–5.3)
RBC # BLD: 3.52 M/UL — LOW (ref 3.8–5.2)
RBC # FLD: 16.5 % — HIGH (ref 10.3–14.5)
SODIUM SERPL-SCNC: 137 MMOL/L — SIGNIFICANT CHANGE UP (ref 135–145)
WBC # BLD: 7.83 K/UL — SIGNIFICANT CHANGE UP (ref 3.8–10.5)
WBC # FLD AUTO: 7.83 K/UL — SIGNIFICANT CHANGE UP (ref 3.8–10.5)

## 2020-03-17 PROCEDURE — 97110 THERAPEUTIC EXERCISES: CPT

## 2020-03-17 PROCEDURE — 82550 ASSAY OF CK (CPK): CPT

## 2020-03-17 PROCEDURE — 83010 ASSAY OF HAPTOGLOBIN QUANT: CPT

## 2020-03-17 PROCEDURE — 83735 ASSAY OF MAGNESIUM: CPT

## 2020-03-17 PROCEDURE — 86900 BLOOD TYPING SEROLOGIC ABO: CPT

## 2020-03-17 PROCEDURE — 96374 THER/PROPH/DIAG INJ IV PUSH: CPT

## 2020-03-17 PROCEDURE — 36430 TRANSFUSION BLD/BLD COMPNT: CPT

## 2020-03-17 PROCEDURE — 82553 CREATINE MB FRACTION: CPT

## 2020-03-17 PROCEDURE — 86901 BLOOD TYPING SEROLOGIC RH(D): CPT

## 2020-03-17 PROCEDURE — 82607 VITAMIN B-12: CPT

## 2020-03-17 PROCEDURE — 85610 PROTHROMBIN TIME: CPT

## 2020-03-17 PROCEDURE — 86850 RBC ANTIBODY SCREEN: CPT

## 2020-03-17 PROCEDURE — 82272 OCCULT BLD FECES 1-3 TESTS: CPT

## 2020-03-17 PROCEDURE — 93306 TTE W/DOPPLER COMPLETE: CPT

## 2020-03-17 PROCEDURE — 71045 X-RAY EXAM CHEST 1 VIEW: CPT

## 2020-03-17 PROCEDURE — 97530 THERAPEUTIC ACTIVITIES: CPT

## 2020-03-17 PROCEDURE — 84484 ASSAY OF TROPONIN QUANT: CPT

## 2020-03-17 PROCEDURE — 82728 ASSAY OF FERRITIN: CPT

## 2020-03-17 PROCEDURE — P9040: CPT

## 2020-03-17 PROCEDURE — 97116 GAIT TRAINING THERAPY: CPT

## 2020-03-17 PROCEDURE — 97162 PT EVAL MOD COMPLEX 30 MIN: CPT

## 2020-03-17 PROCEDURE — 93005 ELECTROCARDIOGRAM TRACING: CPT

## 2020-03-17 PROCEDURE — 83690 ASSAY OF LIPASE: CPT

## 2020-03-17 PROCEDURE — 86923 COMPATIBILITY TEST ELECTRIC: CPT

## 2020-03-17 PROCEDURE — 80061 LIPID PANEL: CPT

## 2020-03-17 PROCEDURE — 88312 SPECIAL STAINS GROUP 1: CPT

## 2020-03-17 PROCEDURE — 80053 COMPREHEN METABOLIC PANEL: CPT

## 2020-03-17 PROCEDURE — 84100 ASSAY OF PHOSPHORUS: CPT

## 2020-03-17 PROCEDURE — 82962 GLUCOSE BLOOD TEST: CPT

## 2020-03-17 PROCEDURE — 74177 CT ABD & PELVIS W/CONTRAST: CPT

## 2020-03-17 PROCEDURE — 99285 EMERGENCY DEPT VISIT HI MDM: CPT

## 2020-03-17 PROCEDURE — 85027 COMPLETE CBC AUTOMATED: CPT

## 2020-03-17 PROCEDURE — 83036 HEMOGLOBIN GLYCOSYLATED A1C: CPT

## 2020-03-17 PROCEDURE — 80048 BASIC METABOLIC PNL TOTAL CA: CPT

## 2020-03-17 PROCEDURE — 85652 RBC SED RATE AUTOMATED: CPT

## 2020-03-17 PROCEDURE — 85045 AUTOMATED RETICULOCYTE COUNT: CPT

## 2020-03-17 PROCEDURE — 88305 TISSUE EXAM BY PATHOLOGIST: CPT

## 2020-03-17 PROCEDURE — 36415 COLL VENOUS BLD VENIPUNCTURE: CPT

## 2020-03-17 PROCEDURE — 82746 ASSAY OF FOLIC ACID SERUM: CPT

## 2020-03-17 PROCEDURE — 83550 IRON BINDING TEST: CPT

## 2020-03-17 PROCEDURE — 84443 ASSAY THYROID STIM HORMONE: CPT

## 2020-03-17 PROCEDURE — 70450 CT HEAD/BRAIN W/O DYE: CPT

## 2020-03-17 PROCEDURE — 84466 ASSAY OF TRANSFERRIN: CPT

## 2020-03-17 PROCEDURE — 83540 ASSAY OF IRON: CPT

## 2020-03-17 RX ORDER — MELOXICAM 15 MG/1
1 TABLET ORAL
Qty: 0 | Refills: 0 | DISCHARGE

## 2020-03-17 RX ORDER — AMLODIPINE BESYLATE 2.5 MG/1
1 TABLET ORAL
Qty: 0 | Refills: 0 | DISCHARGE

## 2020-03-17 RX ORDER — PANTOPRAZOLE SODIUM 20 MG/1
1 TABLET, DELAYED RELEASE ORAL
Qty: 60 | Refills: 0
Start: 2020-03-17 | End: 2020-04-15

## 2020-03-17 RX ORDER — APIXABAN 2.5 MG/1
1 TABLET, FILM COATED ORAL
Qty: 0 | Refills: 0 | DISCHARGE

## 2020-03-17 RX ORDER — PANTOPRAZOLE SODIUM 20 MG/1
40 TABLET, DELAYED RELEASE ORAL ONCE
Refills: 0 | Status: COMPLETED | OUTPATIENT
Start: 2020-03-17 | End: 2020-03-17

## 2020-03-17 RX ADMIN — PANTOPRAZOLE SODIUM 40 MILLIGRAM(S): 20 TABLET, DELAYED RELEASE ORAL at 06:36

## 2020-03-17 RX ADMIN — CARVEDILOL PHOSPHATE 3.12 MILLIGRAM(S): 80 CAPSULE, EXTENDED RELEASE ORAL at 06:36

## 2020-03-17 NOTE — DISCHARGE NOTE PROVIDER - CARE PROVIDERS DIRECT ADDRESSES
[email or web address]

## 2020-03-17 NOTE — DISCHARGE NOTE PROVIDER - CARE PROVIDER_API CALL
Reece Poe)  Gastroenterology; Internal Medicine  [address] [place] [id number]  Phone: [phone]  Fax: [phone]  Follow Up Time:

## 2020-03-17 NOTE — DISCHARGE NOTE PROVIDER - NSDCMRMEDTOKEN_GEN_ALL_CORE_FT
amLODIPine 5 mg oral tablet: 1 tab(s) orally once a day  carvedilol 3.125 mg oral tablet: 1 tab(s) orally 2 times a day  Eliquis 2.5 mg oral tablet: 1 tab(s) orally 2 times a day  folic acid 1 mg oral tablet: 1 tab(s) orally once a day  prednisoLONE (as acetate) 5 mg/5 mL oral suspension: 5 milligram(s) orally once a day carvedilol 3.125 mg oral tablet: 1 tab(s) orally 2 times a day  folic acid 1 mg oral tablet: 1 tab(s) orally once a day  prednisoLONE (as acetate) 5 mg/5 mL oral suspension: 5 milligram(s) orally once a day  Protonix 40 mg oral delayed release tablet: 1 tab(s) orally 2 times a day

## 2020-03-17 NOTE — DISCHARGE NOTE PROVIDER - NSDCCPCAREPLAN_GEN_ALL_CORE_FT
PRINCIPAL DISCHARGE DIAGNOSIS  Diagnosis: UGIB (upper gastrointestinal bleed)  Assessment and Plan of Treatment: You were found to have anemia on admission, You were given 2 units of PRBC. You were found to have multiple stomach ulcer and a large antral ulcer on endoscopy. You      SECONDARY DISCHARGE DIAGNOSES  Diagnosis: New onset atrial fibrillation  Assessment and Plan of Treatment: PRINCIPAL DISCHARGE DIAGNOSIS  Diagnosis: UGIB (upper gastrointestinal bleed)  Assessment and Plan of Treatment: You were found to have anemia on admission, You were given 2 units of PRBC. You were found to have multiple stomach ulcer and a large antral ulcer on endoscopy. Please follow up for results of biopsy. You are recommended to take protonix twice a day. Stop taking eliquis for risk of bleeding. Please follow up with primary care doctor in a week.      SECONDARY DISCHARGE DIAGNOSES  Diagnosis: Atrial fibrillation  Assessment and Plan of Treatment: You can continue taking coreg for rate control. Please stop taking eliquis as it can cause bleeding. Please seek medical attention if you have any signs of bleeding. PLease follow up with cardiologist in 2 weeks

## 2020-03-17 NOTE — DISCHARGE NOTE PROVIDER - HOSPITAL COURSE
84 y.o.F Cantonese speaking, from home, ambulates with walker, lives with family with PMH of Dementia, RA, Afib (Eliquis), HTN presented to ED with no BM and not urinating since 3 days. Pt normally has BM daily. Spoke to the Grand daughter on phone.  Pt is not speaking much, has waxing and waning mentation. Pt is c/o Heart burn, abdominal pain, not eating, loss of appetite, weight loss of 10 pounds in a month. Pt had a fall last month and hurt her Rt leg. Her last colonoscopy was 10 yrs back and was normal.  Pt is taking Meloxicam, Ibuprofen for arthritic pain. Pt had Black colored stools, jet black, watery stools 3 weeks ago and was taking Ranitidine, Omeprazole which gave her relief and bleeding stopped. Pt was taking Amoxicillin for last month for unknown reason. Pt is not taking prednisone from January 2020 for RA. Patient denies cough, sputum production, fevers/chills/nausea/vomiting. No diarrhea.    endoscopy was done Multiple small clean based ulcers. Biopsy taken [2]    Antrum	Large 2.5 cm clean based ulcer with no evidence of bleeding. Biopsy at the edge of the ulcer taken [1] 84 y.o.F Cantonese speaking, from home, ambulates with walker, lives with family with PMH of Dementia, RA, Afib (Eliquis), HTN presented to ED with no BM and not urinating since 3 days. Pt normally has BM daily. Spoke to the Grand daughter on phone.  Pt is not speaking much, has waxing and waning mentation. Pt is c/o Heart burn, abdominal pain, not eating, loss of appetite, weight loss of 10 pounds in a month. Pt had a fall last month and hurt her Rt leg. Her last colonoscopy was 10 yrs back and was normal.  Pt is taking Meloxicam, Ibuprofen for arthritic pain. Pt had Black colored stools, jet black, watery stools 3 weeks ago and was taking Ranitidine, Omeprazole which gave her relief and bleeding stopped. Pt was taking Amoxicillin for last month for unknown reason. Pt is not taking prednisone from January 2020 for RA. Patient denies cough, sputum production, fevers/chills/nausea/vomiting. No diarrhea.    endoscopy was done Multiple small clean based ulcers. Biopsy taken [2]    Antrum	Large 2.5 cm clean based ulcer with no evidence of bleeding. Biopsy at the edge of the ulcer taken [1]    She was stopped on anticoagulation for risk of bleeding    Given patient's improved clinical status and current hemodynamic stability, decision was made to discharge the patient.    Patient is stable for discharge per attending and is advised to follow up with PCP as outpatient    Please refer to patient's complete medical chart with documents for a full hospital course, for this is only a brief summary.

## 2020-03-17 NOTE — DISCHARGE NOTE NURSING/CASE MANAGEMENT/SOCIAL WORK - PATIENT PORTAL LINK FT
You can access the FollowMyHealth Patient Portal offered by [facility] by registering at the following website: [email or web address]. By joining [employer]’s FollowMyHealth portal, you will also be able to view your health information using other applications (apps) compatible with our system.

## 2020-03-17 NOTE — PROGRESS NOTE ADULT - PROBLEM SELECTOR PLAN 1
GI bleed, occult +, not bleeding actively, Grand daughter denies any blood in stool. Normal abd exam , good bowel sounds, pt is passing gas   Endoscopy was done today which showed large antral ulcer and multiple body ulcers, not actively bleeding. Biopsy was taken.  Dr Dejesus recommended not to start AC as she is at high risk for bleeding.   Started on regular diet  cbc has been stable  will not start ac

## 2020-03-17 NOTE — DISCHARGE NOTE PROVIDER - NSDCHHNEEDSERVICE_GEN_ALL_CORE
Wound care and assessment/Medication teaching and assessment/Observation and assessment/Rehabilitation services/Teaching and training

## 2020-03-17 NOTE — PROGRESS NOTE ADULT - SUBJECTIVE AND OBJECTIVE BOX
PGY 1 Note discussed with supervising resident and primary attending    Patient is a 84y old  Female who presents with a chief complaint of Bleeding in stools. (17 Mar 2020 08:22)      INTERVAL HPI/OVERNIGHT EVENTS:   patient has been stable overnight  She is planned for FRANCIS  no active bleeding  Vitals have been stable    MEDICATIONS  (STANDING):  carvedilol 3.125 milliGRAM(s) Oral every 12 hours  folic acid 1 milliGRAM(s) Oral daily  pantoprazole  Injectable 40 milliGRAM(s) IV Push two times a day    MEDICATIONS  (PRN):  acetaminophen   Tablet .. 650 milliGRAM(s) Oral daily PRN Mild Pain (1 - 3)      __________________________________________________  REVIEW OF SYSTEMS:    CONSTITUTIONAL: No fever,   EYES: no acute visual disturbances  NECK: No pain or stiffness  RESPIRATORY: No cough; No shortness of breath  CARDIOVASCULAR: No chest pain, no palpitations  GASTROINTESTINAL: No pain. No nausea or vomiting; No diarrhea   NEUROLOGICAL: No headache or numbness, no tremors  MUSCULOSKELETAL: No joint pain, no muscle pain  GENITOURINARY: no dysuria, no frequency, no hesitancy  PSYCHIATRY: no depression , no anxiety  ALL OTHER  ROS negative        Vital Signs Last 24 Hrs  T(C): 36.8 (17 Mar 2020 07:47), Max: 37.1 (16 Mar 2020 21:13)  T(F): 98.3 (17 Mar 2020 07:47), Max: 98.7 (16 Mar 2020 21:13)  HR: 55 (17 Mar 2020 07:47) (55 - 66)  BP: 147/84 (17 Mar 2020 07:47) (132/73 - 173/73)  BP(mean): --  RR: 18 (17 Mar 2020 07:47) (17 - 18)  SpO2: 97% (17 Mar 2020 07:47) (97% - 99%)    ________________________________________________  PHYSICAL EXAM:  GENERAL: NAD  HEENT: Normocephalic;  conjunctivae and sclerae clear; moist mucous membranes;   NECK : supple  CHEST/LUNG: Clear to auscultation bilaterally with good air entry   HEART: S1 S2  regular; no murmurs, gallops or rubs  ABDOMEN: Soft, Nontender, Nondistended; Bowel sounds present  EXTREMITIES: no cyanosis; no edema; no calf tenderness  SKIN: warm and dry; no rash  NERVOUS SYSTEM:  Awake and alert; Oriented  to place, person and time ; no new deficits    _________________________________________________  LABS:                        10.5   7.83  )-----------( 272      ( 17 Mar 2020 07:10 )             31.2     03-17    137  |  109<H>  |  7   ----------------------------<  85  3.8   |  23  |  0.76    Ca    7.7<L>      17 Mar 2020 07:10  Phos  1.9     03-16  Mg     2.1     03-16    TPro  5.8<L>  /  Alb  2.3<L>  /  TBili  0.6  /  DBili  x   /  AST  27  /  ALT  20  /  AlkPhos  79  03-16        CAPILLARY BLOOD GLUCOSE      POCT Blood Glucose.: 106 mg/dL (17 Mar 2020 06:38)  POCT Blood Glucose.: 110 mg/dL (17 Mar 2020 00:12)  POCT Blood Glucose.: 101 mg/dL (16 Mar 2020 21:29)  POCT Blood Glucose.: 114 mg/dL (16 Mar 2020 17:08)  POCT Blood Glucose.: 86 mg/dL (16 Mar 2020 12:46)        RADIOLOGY & ADDITIONAL TESTS:    Imaging Personally Reviewed:  YES/NO    Consultant(s) Notes Reviewed:   YES/ No    Care Discussed with Consultants :     Plan of care was discussed with patient and /or primary care giver; all questions and concerns were addressed and care was aligned with patient's wishes.

## 2020-03-20 RX ORDER — PANTOPRAZOLE SODIUM 20 MG/1
1 TABLET, DELAYED RELEASE ORAL
Qty: 60 | Refills: 0
Start: 2020-03-20 | End: 2020-04-18

## 2020-04-17 ENCOUNTER — TRANSCRIPTION ENCOUNTER (OUTPATIENT)
Age: 85
End: 2020-04-17

## 2020-04-17 ENCOUNTER — INPATIENT (INPATIENT)
Facility: HOSPITAL | Age: 85
LOS: 0 days | DRG: 853 | End: 2020-04-18
Attending: INTERNAL MEDICINE | Admitting: INTERNAL MEDICINE
Payer: MEDICARE

## 2020-04-17 VITALS
HEART RATE: 92 BPM | OXYGEN SATURATION: 95 % | RESPIRATION RATE: 26 BRPM | WEIGHT: 110.01 LBS | DIASTOLIC BLOOD PRESSURE: 64 MMHG | HEIGHT: 65 IN | SYSTOLIC BLOOD PRESSURE: 88 MMHG

## 2020-04-17 DIAGNOSIS — K66.8 OTHER SPECIFIED DISORDERS OF PERITONEUM: ICD-10-CM

## 2020-04-17 LAB
ALBUMIN SERPL ELPH-MCNC: 2.5 G/DL — LOW (ref 3.5–5)
ALP SERPL-CCNC: 48 U/L — SIGNIFICANT CHANGE UP (ref 40–120)
ALT FLD-CCNC: 21 U/L DA — SIGNIFICANT CHANGE UP (ref 10–60)
ANION GAP SERPL CALC-SCNC: 15 MMOL/L — SIGNIFICANT CHANGE UP (ref 5–17)
ANISOCYTOSIS BLD QL: SLIGHT — SIGNIFICANT CHANGE UP
APPEARANCE UR: CLEAR — SIGNIFICANT CHANGE UP
AST SERPL-CCNC: 38 U/L — SIGNIFICANT CHANGE UP (ref 10–40)
BASOPHILS # BLD AUTO: 0 K/UL — SIGNIFICANT CHANGE UP (ref 0–0.2)
BASOPHILS # BLD AUTO: 0.01 K/UL — SIGNIFICANT CHANGE UP (ref 0–0.2)
BASOPHILS NFR BLD AUTO: 0 % — SIGNIFICANT CHANGE UP (ref 0–2)
BASOPHILS NFR BLD AUTO: 0.3 % — SIGNIFICANT CHANGE UP (ref 0–2)
BILIRUB SERPL-MCNC: 0.5 MG/DL — SIGNIFICANT CHANGE UP (ref 0.2–1.2)
BILIRUB UR-MCNC: NEGATIVE — SIGNIFICANT CHANGE UP
BUN SERPL-MCNC: 86 MG/DL — HIGH (ref 7–18)
CALCIUM SERPL-MCNC: 9.4 MG/DL — SIGNIFICANT CHANGE UP (ref 8.4–10.5)
CHLORIDE SERPL-SCNC: 102 MMOL/L — SIGNIFICANT CHANGE UP (ref 96–108)
CO2 SERPL-SCNC: 16 MMOL/L — LOW (ref 22–31)
COLOR SPEC: YELLOW — SIGNIFICANT CHANGE UP
CREAT SERPL-MCNC: 2.05 MG/DL — HIGH (ref 0.5–1.3)
DIFF PNL FLD: NEGATIVE — SIGNIFICANT CHANGE UP
EOSINOPHIL # BLD AUTO: 0 K/UL — SIGNIFICANT CHANGE UP (ref 0–0.5)
EOSINOPHIL # BLD AUTO: 0 K/UL — SIGNIFICANT CHANGE UP (ref 0–0.5)
EOSINOPHIL NFR BLD AUTO: 0 % — SIGNIFICANT CHANGE UP (ref 0–6)
EOSINOPHIL NFR BLD AUTO: 0 % — SIGNIFICANT CHANGE UP (ref 0–6)
GLUCOSE SERPL-MCNC: 165 MG/DL — HIGH (ref 70–99)
GLUCOSE UR QL: NEGATIVE — SIGNIFICANT CHANGE UP
HCT VFR BLD CALC: 21.8 % — LOW (ref 34.5–45)
HCT VFR BLD CALC: 27.9 % — LOW (ref 34.5–45)
HGB BLD-MCNC: 6.9 G/DL — CRITICAL LOW (ref 11.5–15.5)
HGB BLD-MCNC: 8.7 G/DL — LOW (ref 11.5–15.5)
HYPOCHROMIA BLD QL: SLIGHT — SIGNIFICANT CHANGE UP
HYPOSEGMENTATION: PRESENT — SIGNIFICANT CHANGE UP
IMM GRANULOCYTES NFR BLD AUTO: 2.4 % — HIGH (ref 0–1.5)
KETONES UR-MCNC: NEGATIVE — SIGNIFICANT CHANGE UP
LACTATE SERPL-SCNC: 5.1 MMOL/L — CRITICAL HIGH (ref 0.7–2)
LEUKOCYTE ESTERASE UR-ACNC: NEGATIVE — SIGNIFICANT CHANGE UP
LIDOCAIN IGE QN: 477 U/L — HIGH (ref 73–393)
LYMPHOCYTES # BLD AUTO: 0.44 K/UL — LOW (ref 1–3.3)
LYMPHOCYTES # BLD AUTO: 0.87 K/UL — LOW (ref 1–3.3)
LYMPHOCYTES # BLD AUTO: 23.5 % — SIGNIFICANT CHANGE UP (ref 13–44)
LYMPHOCYTES # BLD AUTO: 8 % — LOW (ref 13–44)
MACROCYTES BLD QL: SLIGHT — SIGNIFICANT CHANGE UP
MANUAL SMEAR VERIFICATION: SIGNIFICANT CHANGE UP
MCHC RBC-ENTMCNC: 29.7 PG — SIGNIFICANT CHANGE UP (ref 27–34)
MCHC RBC-ENTMCNC: 30 PG — SIGNIFICANT CHANGE UP (ref 27–34)
MCHC RBC-ENTMCNC: 31.2 GM/DL — LOW (ref 32–36)
MCHC RBC-ENTMCNC: 31.7 GM/DL — LOW (ref 32–36)
MCV RBC AUTO: 94.8 FL — SIGNIFICANT CHANGE UP (ref 80–100)
MCV RBC AUTO: 95.2 FL — SIGNIFICANT CHANGE UP (ref 80–100)
METAMYELOCYTES # FLD: 1 % — HIGH (ref 0–0)
MONOCYTES # BLD AUTO: 0.2 K/UL — SIGNIFICANT CHANGE UP (ref 0–0.9)
MONOCYTES # BLD AUTO: 0.27 K/UL — SIGNIFICANT CHANGE UP (ref 0–0.9)
MONOCYTES NFR BLD AUTO: 5 % — SIGNIFICANT CHANGE UP (ref 2–14)
MONOCYTES NFR BLD AUTO: 5.4 % — SIGNIFICANT CHANGE UP (ref 2–14)
MYELOCYTES NFR BLD: 3 % — HIGH (ref 0–0)
NEUTROPHILS # BLD AUTO: 2.54 K/UL — SIGNIFICANT CHANGE UP (ref 1.8–7.4)
NEUTROPHILS # BLD AUTO: 4.12 K/UL — SIGNIFICANT CHANGE UP (ref 1.8–7.4)
NEUTROPHILS NFR BLD AUTO: 67 % — SIGNIFICANT CHANGE UP (ref 43–77)
NEUTROPHILS NFR BLD AUTO: 68.4 % — SIGNIFICANT CHANGE UP (ref 43–77)
NEUTS BAND # BLD: 8 % — SIGNIFICANT CHANGE UP (ref 0–8)
NITRITE UR-MCNC: NEGATIVE — SIGNIFICANT CHANGE UP
NRBC # BLD: 0 /100 WBCS — SIGNIFICANT CHANGE UP (ref 0–0)
NRBC # BLD: 1 /100 — HIGH (ref 0–0)
OVALOCYTES BLD QL SMEAR: SLIGHT — SIGNIFICANT CHANGE UP
PH UR: 5 — SIGNIFICANT CHANGE UP (ref 5–8)
PLAT MORPH BLD: NORMAL — SIGNIFICANT CHANGE UP
PLATELET # BLD AUTO: 229 K/UL — SIGNIFICANT CHANGE UP (ref 150–400)
PLATELET # BLD AUTO: 273 K/UL — SIGNIFICANT CHANGE UP (ref 150–400)
POIKILOCYTOSIS BLD QL AUTO: SLIGHT — SIGNIFICANT CHANGE UP
POTASSIUM SERPL-MCNC: 4.8 MMOL/L — SIGNIFICANT CHANGE UP (ref 3.5–5.3)
POTASSIUM SERPL-SCNC: 4.8 MMOL/L — SIGNIFICANT CHANGE UP (ref 3.5–5.3)
PROT SERPL-MCNC: 5.8 G/DL — LOW (ref 6–8.3)
PROT UR-MCNC: NEGATIVE — SIGNIFICANT CHANGE UP
RBC # BLD: 2.3 M/UL — LOW (ref 3.8–5.2)
RBC # BLD: 2.93 M/UL — LOW (ref 3.8–5.2)
RBC # FLD: 18.9 % — HIGH (ref 10.3–14.5)
RBC # FLD: 18.9 % — HIGH (ref 10.3–14.5)
RBC BLD AUTO: ABNORMAL
SCHISTOCYTES BLD QL AUTO: SLIGHT — SIGNIFICANT CHANGE UP
SODIUM SERPL-SCNC: 133 MMOL/L — LOW (ref 135–145)
SP GR SPEC: 1.01 — SIGNIFICANT CHANGE UP (ref 1.01–1.02)
TROPONIN I SERPL-MCNC: 0.3 NG/ML — HIGH (ref 0–0.04)
UROBILINOGEN FLD QL: NEGATIVE — SIGNIFICANT CHANGE UP
VARIANT LYMPHS # BLD: 8 % — HIGH (ref 0–6)
WBC # BLD: 3.71 K/UL — LOW (ref 3.8–10.5)
WBC # BLD: 5.49 K/UL — SIGNIFICANT CHANGE UP (ref 3.8–10.5)
WBC # FLD AUTO: 3.71 K/UL — LOW (ref 3.8–10.5)
WBC # FLD AUTO: 5.49 K/UL — SIGNIFICANT CHANGE UP (ref 3.8–10.5)

## 2020-04-17 PROCEDURE — 74176 CT ABD & PELVIS W/O CONTRAST: CPT | Mod: 26

## 2020-04-17 PROCEDURE — 71045 X-RAY EXAM CHEST 1 VIEW: CPT | Mod: 26

## 2020-04-17 PROCEDURE — 99285 EMERGENCY DEPT VISIT HI MDM: CPT

## 2020-04-17 PROCEDURE — 99223 1ST HOSP IP/OBS HIGH 75: CPT | Mod: 57

## 2020-04-17 RX ORDER — MORPHINE SULFATE 50 MG/1
4 CAPSULE, EXTENDED RELEASE ORAL ONCE
Refills: 0 | Status: DISCONTINUED | OUTPATIENT
Start: 2020-04-17 | End: 2020-04-17

## 2020-04-17 RX ORDER — ONDANSETRON 8 MG/1
4 TABLET, FILM COATED ORAL ONCE
Refills: 0 | Status: COMPLETED | OUTPATIENT
Start: 2020-04-17 | End: 2020-04-17

## 2020-04-17 RX ORDER — SODIUM CHLORIDE 9 MG/ML
1000 INJECTION, SOLUTION INTRAVENOUS ONCE
Refills: 0 | Status: COMPLETED | OUTPATIENT
Start: 2020-04-17 | End: 2020-04-17

## 2020-04-17 RX ORDER — PIPERACILLIN AND TAZOBACTAM 4; .5 G/20ML; G/20ML
3.38 INJECTION, POWDER, LYOPHILIZED, FOR SOLUTION INTRAVENOUS ONCE
Refills: 0 | Status: COMPLETED | OUTPATIENT
Start: 2020-04-17 | End: 2020-04-17

## 2020-04-17 RX ADMIN — ONDANSETRON 4 MILLIGRAM(S): 8 TABLET, FILM COATED ORAL at 21:34

## 2020-04-17 RX ADMIN — MORPHINE SULFATE 4 MILLIGRAM(S): 50 CAPSULE, EXTENDED RELEASE ORAL at 22:08

## 2020-04-17 RX ADMIN — PIPERACILLIN AND TAZOBACTAM 200 GRAM(S): 4; .5 INJECTION, POWDER, LYOPHILIZED, FOR SOLUTION INTRAVENOUS at 23:44

## 2020-04-17 RX ADMIN — SODIUM CHLORIDE 1000 MILLILITER(S): 9 INJECTION, SOLUTION INTRAVENOUS at 23:50

## 2020-04-17 NOTE — ED PROVIDER NOTE - CLINICAL SUMMARY MEDICAL DECISION MAKING FREE TEXT BOX
83 yo F presented  to ED for abdominal pain due to perforation. Patient admitted to surgery for further management.

## 2020-04-17 NOTE — ED PROVIDER NOTE - OBJECTIVE STATEMENT
85 yo F with PMH of dementia, afib (eliquis) HTN presents to ED from nursing home for abdominal pain. She has not had any fever, cough, sob, nausea, vomiting.
ADMIT

## 2020-04-17 NOTE — ED ADULT NURSE NOTE - NSSUHOSCREENINGYN_ED_ALL_ED
Azithromycin Counseling:  I discussed with the patient the risks of azithromycin including but not limited to GI upset, allergic reaction, drug rash, diarrhea, and yeast infections. Azithromycin Pregnancy And Lactation Text: This medication is considered safe during pregnancy and is also secreted in breast milk. Topical Sulfur Applications Counseling: Topical Sulfur Counseling: Patient counseled that this medication may cause skin irritation or allergic reactions.  In the event of skin irritation, the patient was advised to reduce the amount of the drug applied or use it less frequently.   The patient verbalized understanding of the proper use and possible adverse effects of topical sulfur application.  All of the patient's questions and concerns were addressed. Topical Clindamycin Pregnancy And Lactation Text: This medication is Pregnancy Category B and is considered safe during pregnancy. It is unknown if it is excreted in breast milk. Isotretinoin Pregnancy And Lactation Text: This medication is Pregnancy Category X and is considered extremely dangerous during pregnancy. It is unknown if it is excreted in breast milk. Topical Retinoid Pregnancy And Lactation Text: This medication is Pregnancy Category C. It is unknown if this medication is excreted in breast milk. Topical Sulfur Applications Pregnancy And Lactation Text: This medication is Pregnancy Category C and has an unknown safety profile during pregnancy. It is unknown if this topical medication is excreted in breast milk. Spironolactone Counseling: Patient advised regarding risks of diarrhea, abdominal pain, hyperkalemia, birth defects (for female patients), liver toxicity and renal toxicity. The patient may need blood work to monitor liver and kidney function and potassium levels while on therapy. The patient verbalized understanding of the proper use and possible adverse effects of spironolactone.  All of the patient's questions and concerns were addressed. Tazorac Pregnancy And Lactation Text: This medication is not safe during pregnancy. It is unknown if this medication is excreted in breast milk. Patient Specific Counseling (Will Not Stick From Patient To Patient): Patient has a unique GI issue (also h/o pancreatitis secondary to ERCP) and will have her specialist weigh in before starting Accutane Erythromycin Counseling:  I discussed with the patient the risks of erythromycin including but not limited to GI upset, allergic reaction, drug rash, diarrhea, increase in liver enzymes, and yeast infections. Benzoyl Peroxide Pregnancy And Lactation Text: This medication is Pregnancy Category C. It is unknown if benzoyl peroxide is excreted in breast milk. Minocycline Counseling: Patient advised regarding possible photosensitivity and discoloration of the teeth, skin, lips, tongue and gums.  Patient instructed to avoid sunlight, if possible.  When exposed to sunlight, patients should wear protective clothing, sunglasses, and sunscreen.  The patient was instructed to call the office immediately if the following severe adverse effects occur:  hearing changes, easy bruising/bleeding, severe headache, or vision changes.  The patient verbalized understanding of the proper use and possible adverse effects of minocycline.  All of the patient's questions and concerns were addressed. High Dose Vitamin A Pregnancy And Lactation Text: High dose vitamin A therapy is contraindicated during pregnancy and breast feeding. Benzoyl Peroxide Counseling: Patient counseled that medicine may cause skin irritation and bleach clothing.  In the event of skin irritation, the patient was advised to reduce the amount of the drug applied or use it less frequently.   The patient verbalized understanding of the proper use and possible adverse effects of benzoyl peroxide.  All of the patient's questions and concerns were addressed. Spironolactone Pregnancy And Lactation Text: This medication can cause feminization of the male fetus and should be avoided during pregnancy. The active metabolite is also found in breast milk. Tazorac Counseling:  Patient advised that medication is irritating and drying.  Patient may need to apply sparingly and wash off after an hour before eventually leaving it on overnight.  The patient verbalized understanding of the proper use and possible adverse effects of tazorac.  All of the patient's questions and concerns were addressed. Topical Clindamycin Counseling: Patient counseled that this medication may cause skin irritation or allergic reactions.  In the event of skin irritation, the patient was advised to reduce the amount of the drug applied or use it less frequently.   The patient verbalized understanding of the proper use and possible adverse effects of clindamycin.  All of the patient's questions and concerns were addressed. Minocycline Pregnancy And Lactation Text: This medication is Pregnancy Category D and not consider safe during pregnancy. It is also excreted in breast milk. Detail Level: Detailed Doxycycline Counseling:  Patient counseled regarding possible photosensitivity and increased risk for sunburn.  Patient instructed to avoid sunlight, if possible.  When exposed to sunlight, patients should wear protective clothing, sunglasses, and sunscreen.  The patient was instructed to call the office immediately if the following severe adverse effects occur:  hearing changes, easy bruising/bleeding, severe headache, or vision changes.  The patient verbalized understanding of the proper use and possible adverse effects of doxycycline.  All of the patient's questions and concerns were addressed. Bactrim Pregnancy And Lactation Text: This medication is Pregnancy Category D and is known to cause fetal risk.  It is also excreted in breast milk. Bactrim Counseling:  I discussed with the patient the risks of sulfa antibiotics including but not limited to GI upset, allergic reaction, drug rash, diarrhea, dizziness, photosensitivity, and yeast infections.  Rarely, more serious reactions can occur including but not limited to aplastic anemia, agranulocytosis, methemoglobinemia, blood dyscrasias, liver or kidney failure, lung infiltrates or desquamative/blistering drug rashes. Erythromycin Pregnancy And Lactation Text: This medication is Pregnancy Category B and is considered safe during pregnancy. It is also excreted in breast milk. Doxycycline Pregnancy And Lactation Text: This medication is Pregnancy Category D and not consider safe during pregnancy. It is also excreted in breast milk but is considered safe for shorter treatment courses. High Dose Vitamin A Counseling: Side effects reviewed, pt to contact office should one occur. Topical Retinoid counseling:  Patient advised to apply a pea-sized amount only at bedtime and wait 30 minutes after washing their face before applying.  If too drying, patient may add a non-comedogenic moisturizer. The patient verbalized understanding of the proper use and possible adverse effects of retinoids.  All of the patient's questions and concerns were addressed. Use Enhanced Medication Counseling?: No Tetracycline Counseling: Patient counseled regarding possible photosensitivity and increased risk for sunburn.  Patient instructed to avoid sunlight, if possible.  When exposed to sunlight, patients should wear protective clothing, sunglasses, and sunscreen.  The patient was instructed to call the office immediately if the following severe adverse effects occur:  hearing changes, easy bruising/bleeding, severe headache, or vision changes.  The patient verbalized understanding of the proper use and possible adverse effects of tetracycline.  All of the patient's questions and concerns were addressed. Patient understands to avoid pregnancy while on therapy due to potential birth defects. Dapsone Pregnancy And Lactation Text: This medication is Pregnancy Category C and is not considered safe during pregnancy or breast feeding. Dapsone Counseling: I discussed with the patient the risks of dapsone including but not limited to hemolytic anemia, agranulocytosis, rashes, methemoglobinemia, kidney failure, peripheral neuropathy, headaches, GI upset, and liver toxicity.  Patients who start dapsone require monitoring including baseline LFTs and weekly CBCs for the first month, then every month thereafter.  The patient verbalized understanding of the proper use and possible adverse effects of dapsone.  All of the patient's questions and concerns were addressed. Birth Control Pills Pregnancy And Lactation Text: This medication should be avoided if pregnant and for the first 30 days post-partum. Birth Control Pills Counseling: Birth Control Pill Counseling: I discussed with the patient the potential side effects of OCPs including but not limited to increased risk of stroke, heart attack, thrombophlebitis, deep venous thrombosis, hepatic adenomas, breast changes, GI upset, headaches, and depression.  The patient verbalized understanding of the proper use and possible adverse effects of OCPs. All of the patient's questions and concerns were addressed. Isotretinoin Counseling: Patient should get monthly blood tests, not donate blood, not drive at night if vision affected, not share medication, and not undergo elective surgery for 6 months after tx completed. Side effects reviewed, pt to contact office should one occur. Yes - the patient is able to be screened

## 2020-04-17 NOTE — ED ADULT NURSE NOTE - OBJECTIVE STATEMENT
Patient BIBA from Nursing Home complaining of abdominal pain. Patient speak Mandarin. Patient able to make needs known.

## 2020-04-17 NOTE — ED PROVIDER NOTE - PROGRESS NOTE DETAILS
Talked with patient's daughter, Erin Stephenson ([phone]) to let her know pt is in the hospital Patient has pneumoperitoneum. Surgery aware Updated patient's family.

## 2020-04-17 NOTE — ED PROVIDER NOTE - NS ED ROS FT
Review of Systems   Constitutional:  No Weight Change, No Fever, No Chills, No weakness    ENT/Mouth:  No Nasal Congestion, No Hoarseness, No sore throat, No Rhinorrhea, No Swallowing Difficulty  Eyes:  No Eye Pain, No Swelling, No Redness, No Discharge, No Vision Changes  Cardiovascular:  No Chest Pain, No palpitations,  Respiratory:  No SOB, No Cough, No Wheezing  Gastrointestinal: No Nausea, No Vomiting, + abdominal pain,   Musculoskeletal:  No Arthralgias, No Myalgias, No Joint Swelling, No Joint Stiffness,  Skin:  No rashes

## 2020-04-17 NOTE — ED PROVIDER NOTE - PHYSICAL EXAMINATION
Const: Well nourished, well developed, appears stated age  Eyes: PERRL, no conjunctival injection  HENT:  Neck supple without meningismus   CV: RRR, Warm, well-perfused extremities  RESP: CTA B/L, no tachypnea   GI: soft, mildy tender, non-distended  MSK: No gross deformities appreciated  Skin: Warm, dry. No rashes  Neuro: Alert, CNs II-XII grossly intact. Sensation and motor function of extremities grossly intact.  Psych: Appropriate mood and affect.

## 2020-04-17 NOTE — ED ADULT TRIAGE NOTE - CHIEF COMPLAINT QUOTE
BIBA for c/o  abdominal pain  x few days  denies any nausea and vomiting pt took tylenol PTA no relief

## 2020-04-17 NOTE — ED ADULT NURSE NOTE - LANGUAGE ASSISTANCE NEEDED
No-Patient/Caregiver offered and refused free interpretation services. RN speaks mandarin/No-Patient/Caregiver offered and refused free interpretation services.

## 2020-04-18 VITALS — OXYGEN SATURATION: 98 % | HEART RATE: 38 BPM

## 2020-04-18 PROBLEM — M06.9 RHEUMATOID ARTHRITIS, UNSPECIFIED: Chronic | Status: ACTIVE | Noted: 2020-03-14

## 2020-04-18 PROBLEM — I48.91 UNSPECIFIED ATRIAL FIBRILLATION: Chronic | Status: ACTIVE | Noted: 2020-03-15

## 2020-04-18 LAB
ALBUMIN SERPL ELPH-MCNC: 1.4 G/DL — LOW (ref 3.5–5)
ALBUMIN SERPL ELPH-MCNC: 1.9 G/DL — LOW (ref 3.5–5)
ALP SERPL-CCNC: 37 U/L — LOW (ref 40–120)
ALP SERPL-CCNC: 40 U/L — SIGNIFICANT CHANGE UP (ref 40–120)
ALT FLD-CCNC: 16 U/L DA — SIGNIFICANT CHANGE UP (ref 10–60)
ALT FLD-CCNC: 56 U/L DA — SIGNIFICANT CHANGE UP (ref 10–60)
ANION GAP SERPL CALC-SCNC: 10 MMOL/L — SIGNIFICANT CHANGE UP (ref 5–17)
ANION GAP SERPL CALC-SCNC: 16 MMOL/L — SIGNIFICANT CHANGE UP (ref 5–17)
APTT BLD: 21.4 SEC — LOW (ref 27.5–36.3)
APTT BLD: 28.8 SEC — SIGNIFICANT CHANGE UP (ref 27.5–36.3)
AST SERPL-CCNC: 116 U/L — HIGH (ref 10–40)
AST SERPL-CCNC: 29 U/L — SIGNIFICANT CHANGE UP (ref 10–40)
BASE EXCESS BLDA CALC-SCNC: -13.7 MMOL/L — LOW (ref -2–2)
BASE EXCESS BLDA CALC-SCNC: SIGNIFICANT CHANGE UP MMOL/L (ref -2–2)
BASOPHILS # BLD AUTO: 0.02 K/UL — SIGNIFICANT CHANGE UP (ref 0–0.2)
BASOPHILS NFR BLD AUTO: 0.5 % — SIGNIFICANT CHANGE UP (ref 0–2)
BILIRUB SERPL-MCNC: 0.4 MG/DL — SIGNIFICANT CHANGE UP (ref 0.2–1.2)
BILIRUB SERPL-MCNC: 1 MG/DL — SIGNIFICANT CHANGE UP (ref 0.2–1.2)
BLD GP AB SCN SERPL QL: SIGNIFICANT CHANGE UP
BLOOD GAS COMMENTS ARTERIAL: SIGNIFICANT CHANGE UP
BLOOD GAS COMMENTS ARTERIAL: SIGNIFICANT CHANGE UP
BUN SERPL-MCNC: 64 MG/DL — HIGH (ref 7–18)
BUN SERPL-MCNC: 76 MG/DL — HIGH (ref 7–18)
CALCIUM SERPL-MCNC: 6.7 MG/DL — LOW (ref 8.4–10.5)
CALCIUM SERPL-MCNC: 7.8 MG/DL — LOW (ref 8.4–10.5)
CHLORIDE SERPL-SCNC: 108 MMOL/L — SIGNIFICANT CHANGE UP (ref 96–108)
CHLORIDE SERPL-SCNC: 112 MMOL/L — HIGH (ref 96–108)
CO2 SERPL-SCNC: 13 MMOL/L — LOW (ref 22–31)
CO2 SERPL-SCNC: 17 MMOL/L — LOW (ref 22–31)
CREAT SERPL-MCNC: 1.78 MG/DL — HIGH (ref 0.5–1.3)
CREAT SERPL-MCNC: 1.79 MG/DL — HIGH (ref 0.5–1.3)
D DIMER BLD IA.RAPID-MCNC: 885 NG/ML DDU — HIGH
EOSINOPHIL # BLD AUTO: 0.15 K/UL — SIGNIFICANT CHANGE UP (ref 0–0.5)
EOSINOPHIL NFR BLD AUTO: 3.8 % — SIGNIFICANT CHANGE UP (ref 0–6)
GLUCOSE SERPL-MCNC: 118 MG/DL — HIGH (ref 70–99)
GLUCOSE SERPL-MCNC: 169 MG/DL — HIGH (ref 70–99)
HCO3 BLDA-SCNC: 10 MMOL/L — LOW (ref 23–27)
HCO3 BLDA-SCNC: 13 MMOL/L — LOW (ref 23–27)
HCT VFR BLD CALC: 35.2 % — SIGNIFICANT CHANGE UP (ref 34.5–45)
HCT VFR BLD CALC: 39.1 % — SIGNIFICANT CHANGE UP (ref 34.5–45)
HGB BLD-MCNC: 11.5 G/DL — SIGNIFICANT CHANGE UP (ref 11.5–15.5)
HGB BLD-MCNC: 12.5 G/DL — SIGNIFICANT CHANGE UP (ref 11.5–15.5)
HOROWITZ INDEX BLDA+IHG-RTO: 40 — SIGNIFICANT CHANGE UP
HOROWITZ INDEX BLDA+IHG-RTO: 60 — SIGNIFICANT CHANGE UP
IMM GRANULOCYTES NFR BLD AUTO: 1.5 % — SIGNIFICANT CHANGE UP (ref 0–1.5)
INR BLD: 1.01 RATIO — SIGNIFICANT CHANGE UP (ref 0.88–1.16)
INR BLD: 1.14 RATIO — SIGNIFICANT CHANGE UP (ref 0.88–1.16)
LACTATE SERPL-SCNC: 3.9 MMOL/L — HIGH (ref 0.7–2)
LACTATE SERPL-SCNC: 5.9 MMOL/L — CRITICAL HIGH (ref 0.7–2)
LACTATE SERPL-SCNC: 7.2 MMOL/L — CRITICAL HIGH (ref 0.7–2)
LYMPHOCYTES # BLD AUTO: 0.81 K/UL — LOW (ref 1–3.3)
LYMPHOCYTES # BLD AUTO: 20.3 % — SIGNIFICANT CHANGE UP (ref 13–44)
MCHC RBC-ENTMCNC: 28.5 PG — SIGNIFICANT CHANGE UP (ref 27–34)
MCHC RBC-ENTMCNC: 28.8 PG — SIGNIFICANT CHANGE UP (ref 27–34)
MCHC RBC-ENTMCNC: 32 GM/DL — SIGNIFICANT CHANGE UP (ref 32–36)
MCHC RBC-ENTMCNC: 32.7 GM/DL — SIGNIFICANT CHANGE UP (ref 32–36)
MCV RBC AUTO: 87.1 FL — SIGNIFICANT CHANGE UP (ref 80–100)
MCV RBC AUTO: 90.1 FL — SIGNIFICANT CHANGE UP (ref 80–100)
MONOCYTES # BLD AUTO: 0.08 K/UL — SIGNIFICANT CHANGE UP (ref 0–0.9)
MONOCYTES NFR BLD AUTO: 2 % — SIGNIFICANT CHANGE UP (ref 2–14)
NEUTROPHILS # BLD AUTO: 2.88 K/UL — SIGNIFICANT CHANGE UP (ref 1.8–7.4)
NEUTROPHILS NFR BLD AUTO: 71.9 % — SIGNIFICANT CHANGE UP (ref 43–77)
NRBC # BLD: 18 /100 WBCS — HIGH (ref 0–0)
NRBC # BLD: 4 /100 WBCS — HIGH (ref 0–0)
PCO2 BLDA: 33 MMHG — SIGNIFICANT CHANGE UP (ref 32–46)
PCO2 BLDA: 39 MMHG — SIGNIFICANT CHANGE UP (ref 32–46)
PH BLDA: 7.04 — CRITICAL LOW (ref 7.35–7.45)
PH BLDA: 7.22 — LOW (ref 7.35–7.45)
PLATELET # BLD AUTO: 147 K/UL — LOW (ref 150–400)
PLATELET # BLD AUTO: 181 K/UL — SIGNIFICANT CHANGE UP (ref 150–400)
PO2 BLDA: 152 MMHG — HIGH (ref 74–108)
PO2 BLDA: 93 MMHG — SIGNIFICANT CHANGE UP (ref 74–108)
POTASSIUM SERPL-MCNC: 4.2 MMOL/L — SIGNIFICANT CHANGE UP (ref 3.5–5.3)
POTASSIUM SERPL-MCNC: 4.9 MMOL/L — SIGNIFICANT CHANGE UP (ref 3.5–5.3)
POTASSIUM SERPL-SCNC: 4.2 MMOL/L — SIGNIFICANT CHANGE UP (ref 3.5–5.3)
POTASSIUM SERPL-SCNC: 4.9 MMOL/L — SIGNIFICANT CHANGE UP (ref 3.5–5.3)
PROT SERPL-MCNC: 3.7 G/DL — LOW (ref 6–8.3)
PROT SERPL-MCNC: 4.3 G/DL — LOW (ref 6–8.3)
PROTHROM AB SERPL-ACNC: 11.4 SEC — SIGNIFICANT CHANGE UP (ref 10–12.9)
PROTHROM AB SERPL-ACNC: 12.9 SEC — SIGNIFICANT CHANGE UP (ref 10–12.9)
RBC # BLD: 4.04 M/UL — SIGNIFICANT CHANGE UP (ref 3.8–5.2)
RBC # BLD: 4.34 M/UL — SIGNIFICANT CHANGE UP (ref 3.8–5.2)
RBC # FLD: 17.9 % — HIGH (ref 10.3–14.5)
RBC # FLD: 18.1 % — HIGH (ref 10.3–14.5)
SAO2 % BLDA: 97 % — HIGH (ref 92–96)
SAO2 % BLDA: SIGNIFICANT CHANGE UP % (ref 92–96)
SARS-COV-2 RNA SPEC QL NAA+PROBE: SIGNIFICANT CHANGE UP
SODIUM SERPL-SCNC: 137 MMOL/L — SIGNIFICANT CHANGE UP (ref 135–145)
SODIUM SERPL-SCNC: 139 MMOL/L — SIGNIFICANT CHANGE UP (ref 135–145)
WBC # BLD: 2.7 K/UL — LOW (ref 3.8–10.5)
WBC # BLD: 4 K/UL — SIGNIFICANT CHANGE UP (ref 3.8–10.5)
WBC # FLD AUTO: 2.7 K/UL — LOW (ref 3.8–10.5)
WBC # FLD AUTO: 4 K/UL — SIGNIFICANT CHANGE UP (ref 3.8–10.5)

## 2020-04-18 PROCEDURE — 86900 BLOOD TYPING SEROLOGIC ABO: CPT

## 2020-04-18 PROCEDURE — 86850 RBC ANTIBODY SCREEN: CPT

## 2020-04-18 PROCEDURE — 83690 ASSAY OF LIPASE: CPT

## 2020-04-18 PROCEDURE — 85027 COMPLETE CBC AUTOMATED: CPT

## 2020-04-18 PROCEDURE — 74176 CT ABD & PELVIS W/O CONTRAST: CPT

## 2020-04-18 PROCEDURE — 88307 TISSUE EXAM BY PATHOLOGIST: CPT

## 2020-04-18 PROCEDURE — 94002 VENT MGMT INPAT INIT DAY: CPT

## 2020-04-18 PROCEDURE — 71045 X-RAY EXAM CHEST 1 VIEW: CPT | Mod: 26

## 2020-04-18 PROCEDURE — 86901 BLOOD TYPING SEROLOGIC RH(D): CPT

## 2020-04-18 PROCEDURE — 87040 BLOOD CULTURE FOR BACTERIA: CPT

## 2020-04-18 PROCEDURE — 85730 THROMBOPLASTIN TIME PARTIAL: CPT

## 2020-04-18 PROCEDURE — 87086 URINE CULTURE/COLONY COUNT: CPT

## 2020-04-18 PROCEDURE — 96375 TX/PRO/DX INJ NEW DRUG ADDON: CPT

## 2020-04-18 PROCEDURE — 85379 FIBRIN DEGRADATION QUANT: CPT

## 2020-04-18 PROCEDURE — 82803 BLOOD GASES ANY COMBINATION: CPT

## 2020-04-18 PROCEDURE — 71045 X-RAY EXAM CHEST 1 VIEW: CPT

## 2020-04-18 PROCEDURE — 87070 CULTURE OTHR SPECIMN AEROBIC: CPT

## 2020-04-18 PROCEDURE — 88307 TISSUE EXAM BY PATHOLOGIST: CPT | Mod: 26

## 2020-04-18 PROCEDURE — 85610 PROTHROMBIN TIME: CPT

## 2020-04-18 PROCEDURE — 82962 GLUCOSE BLOOD TEST: CPT

## 2020-04-18 PROCEDURE — 43632 REMOVAL OF STOMACH PARTIAL: CPT

## 2020-04-18 PROCEDURE — 84484 ASSAY OF TROPONIN QUANT: CPT

## 2020-04-18 PROCEDURE — 87635 SARS-COV-2 COVID-19 AMP PRB: CPT

## 2020-04-18 PROCEDURE — 96374 THER/PROPH/DIAG INJ IV PUSH: CPT

## 2020-04-18 PROCEDURE — 80053 COMPREHEN METABOLIC PANEL: CPT

## 2020-04-18 PROCEDURE — 93005 ELECTROCARDIOGRAM TRACING: CPT

## 2020-04-18 PROCEDURE — 83605 ASSAY OF LACTIC ACID: CPT

## 2020-04-18 PROCEDURE — C1889: CPT

## 2020-04-18 PROCEDURE — 99285 EMERGENCY DEPT VISIT HI MDM: CPT | Mod: 25

## 2020-04-18 PROCEDURE — 86923 COMPATIBILITY TEST ELECTRIC: CPT

## 2020-04-18 PROCEDURE — 36430 TRANSFUSION BLD/BLD COMPNT: CPT

## 2020-04-18 PROCEDURE — P9040: CPT

## 2020-04-18 PROCEDURE — 81003 URINALYSIS AUTO W/O SCOPE: CPT

## 2020-04-18 PROCEDURE — 99291 CRITICAL CARE FIRST HOUR: CPT

## 2020-04-18 PROCEDURE — 36415 COLL VENOUS BLD VENIPUNCTURE: CPT

## 2020-04-18 RX ORDER — HYDROMORPHONE HYDROCHLORIDE 2 MG/ML
1 INJECTION INTRAMUSCULAR; INTRAVENOUS; SUBCUTANEOUS EVERY 4 HOURS
Refills: 0 | Status: DISCONTINUED | OUTPATIENT
Start: 2020-04-18 | End: 2020-04-18

## 2020-04-18 RX ORDER — VANCOMYCIN HCL 1 G
750 VIAL (EA) INTRAVENOUS EVERY 24 HOURS
Refills: 0 | Status: DISCONTINUED | OUTPATIENT
Start: 2020-04-18 | End: 2020-04-18

## 2020-04-18 RX ORDER — ONDANSETRON 8 MG/1
4 TABLET, FILM COATED ORAL EVERY 6 HOURS
Refills: 0 | Status: DISCONTINUED | OUTPATIENT
Start: 2020-04-18 | End: 2020-04-18

## 2020-04-18 RX ORDER — DOBUTAMINE HCL 250MG/20ML
5 VIAL (ML) INTRAVENOUS
Qty: 500 | Refills: 0 | Status: DISCONTINUED | OUTPATIENT
Start: 2020-04-18 | End: 2020-04-18

## 2020-04-18 RX ORDER — SODIUM CHLORIDE 9 MG/ML
1000 INJECTION, SOLUTION INTRAVENOUS ONCE
Refills: 0 | Status: COMPLETED | OUTPATIENT
Start: 2020-04-18 | End: 2020-04-18

## 2020-04-18 RX ORDER — NOREPINEPHRINE BITARTRATE/D5W 8 MG/250ML
0.05 PLASTIC BAG, INJECTION (ML) INTRAVENOUS
Qty: 16 | Refills: 0 | Status: DISCONTINUED | OUTPATIENT
Start: 2020-04-18 | End: 2020-04-18

## 2020-04-18 RX ORDER — DOPAMINE HYDROCHLORIDE 40 MG/ML
5 INJECTION, SOLUTION, CONCENTRATE INTRAVENOUS
Qty: 800 | Refills: 0 | Status: DISCONTINUED | OUTPATIENT
Start: 2020-04-18 | End: 2020-04-18

## 2020-04-18 RX ORDER — HEPARIN SODIUM 5000 [USP'U]/ML
5000 INJECTION INTRAVENOUS; SUBCUTANEOUS EVERY 12 HOURS
Refills: 0 | Status: DISCONTINUED | OUTPATIENT
Start: 2020-04-18 | End: 2020-04-18

## 2020-04-18 RX ORDER — PHENYLEPHRINE HYDROCHLORIDE 10 MG/ML
0.3 INJECTION INTRAVENOUS
Qty: 40 | Refills: 0 | Status: DISCONTINUED | OUTPATIENT
Start: 2020-04-18 | End: 2020-04-18

## 2020-04-18 RX ORDER — FUROSEMIDE 40 MG
40 TABLET ORAL DAILY
Refills: 0 | Status: DISCONTINUED | OUTPATIENT
Start: 2020-04-18 | End: 2020-04-18

## 2020-04-18 RX ORDER — MIDAZOLAM HYDROCHLORIDE 1 MG/ML
0.02 INJECTION, SOLUTION INTRAMUSCULAR; INTRAVENOUS
Qty: 100 | Refills: 0 | Status: DISCONTINUED | OUTPATIENT
Start: 2020-04-18 | End: 2020-04-18

## 2020-04-18 RX ORDER — ACETAMINOPHEN 500 MG
1000 TABLET ORAL ONCE
Refills: 0 | Status: COMPLETED | OUTPATIENT
Start: 2020-04-18 | End: 2020-04-18

## 2020-04-18 RX ORDER — PIPERACILLIN AND TAZOBACTAM 4; .5 G/20ML; G/20ML
3.38 INJECTION, POWDER, LYOPHILIZED, FOR SOLUTION INTRAVENOUS EVERY 8 HOURS
Refills: 0 | Status: DISCONTINUED | OUTPATIENT
Start: 2020-04-18 | End: 2020-04-18

## 2020-04-18 RX ORDER — SODIUM CHLORIDE 9 MG/ML
1000 INJECTION, SOLUTION INTRAVENOUS
Refills: 0 | Status: DISCONTINUED | OUTPATIENT
Start: 2020-04-18 | End: 2020-04-18

## 2020-04-18 RX ORDER — PIPERACILLIN AND TAZOBACTAM 4; .5 G/20ML; G/20ML
3.38 INJECTION, POWDER, LYOPHILIZED, FOR SOLUTION INTRAVENOUS ONCE
Refills: 0 | Status: COMPLETED | OUTPATIENT
Start: 2020-04-18 | End: 2020-04-18

## 2020-04-18 RX ORDER — PIPERACILLIN AND TAZOBACTAM 4; .5 G/20ML; G/20ML
3.38 INJECTION, POWDER, LYOPHILIZED, FOR SOLUTION INTRAVENOUS EVERY 12 HOURS
Refills: 0 | Status: DISCONTINUED | OUTPATIENT
Start: 2020-04-18 | End: 2020-04-18

## 2020-04-18 RX ORDER — HYDROMORPHONE HYDROCHLORIDE 2 MG/ML
1 INJECTION INTRAMUSCULAR; INTRAVENOUS; SUBCUTANEOUS
Qty: 100 | Refills: 0 | Status: DISCONTINUED | OUTPATIENT
Start: 2020-04-18 | End: 2020-04-18

## 2020-04-18 RX ORDER — DOPAMINE HYDROCHLORIDE 40 MG/ML
5 INJECTION, SOLUTION, CONCENTRATE INTRAVENOUS
Qty: 400 | Refills: 0 | Status: DISCONTINUED | OUTPATIENT
Start: 2020-04-18 | End: 2020-04-18

## 2020-04-18 RX ORDER — FLUCONAZOLE 150 MG/1
400 TABLET ORAL ONCE
Refills: 0 | Status: COMPLETED | OUTPATIENT
Start: 2020-04-18 | End: 2020-04-18

## 2020-04-18 RX ORDER — FLUCONAZOLE 150 MG/1
400 TABLET ORAL EVERY 24 HOURS
Refills: 0 | Status: DISCONTINUED | OUTPATIENT
Start: 2020-04-19 | End: 2020-04-18

## 2020-04-18 RX ORDER — SODIUM BICARBONATE 1 MEQ/ML
100 SYRINGE (ML) INTRAVENOUS ONCE
Refills: 0 | Status: COMPLETED | OUTPATIENT
Start: 2020-04-18 | End: 2020-04-18

## 2020-04-18 RX ORDER — PANTOPRAZOLE SODIUM 20 MG/1
40 TABLET, DELAYED RELEASE ORAL DAILY
Refills: 0 | Status: DISCONTINUED | OUTPATIENT
Start: 2020-04-18 | End: 2020-04-18

## 2020-04-18 RX ORDER — VANCOMYCIN HCL 1 G
1000 VIAL (EA) INTRAVENOUS ONCE
Refills: 0 | Status: COMPLETED | OUTPATIENT
Start: 2020-04-18 | End: 2020-04-18

## 2020-04-18 RX ORDER — PANTOPRAZOLE SODIUM 20 MG/1
40 TABLET, DELAYED RELEASE ORAL
Refills: 0 | Status: DISCONTINUED | OUTPATIENT
Start: 2020-04-18 | End: 2020-04-18

## 2020-04-18 RX ORDER — CHLORHEXIDINE GLUCONATE 213 G/1000ML
15 SOLUTION TOPICAL EVERY 12 HOURS
Refills: 0 | Status: DISCONTINUED | OUTPATIENT
Start: 2020-04-18 | End: 2020-04-18

## 2020-04-18 RX ORDER — FLUCONAZOLE 150 MG/1
TABLET ORAL
Refills: 0 | Status: DISCONTINUED | OUTPATIENT
Start: 2020-04-18 | End: 2020-04-18

## 2020-04-18 RX ADMIN — SODIUM CHLORIDE 2000 MILLILITER(S): 9 INJECTION, SOLUTION INTRAVENOUS at 22:54

## 2020-04-18 RX ADMIN — CHLORHEXIDINE GLUCONATE 15 MILLILITER(S): 213 SOLUTION TOPICAL at 19:32

## 2020-04-18 RX ADMIN — Medication 2.34 MICROGRAM(S)/KG/MIN: at 15:24

## 2020-04-18 RX ADMIN — Medication 40 MILLIGRAM(S): at 14:20

## 2020-04-18 RX ADMIN — SODIUM CHLORIDE 100 MILLILITER(S): 9 INJECTION, SOLUTION INTRAVENOUS at 19:13

## 2020-04-18 RX ADMIN — SODIUM CHLORIDE 2000 MILLILITER(S): 9 INJECTION, SOLUTION INTRAVENOUS at 20:47

## 2020-04-18 RX ADMIN — PHENYLEPHRINE HYDROCHLORIDE 5.61 MICROGRAM(S)/KG/MIN: 10 INJECTION INTRAVENOUS at 09:32

## 2020-04-18 RX ADMIN — SODIUM CHLORIDE 100 MILLILITER(S): 9 INJECTION, SOLUTION INTRAVENOUS at 08:59

## 2020-04-18 RX ADMIN — Medication 2.34 MICROGRAM(S)/KG/MIN: at 09:00

## 2020-04-18 RX ADMIN — PIPERACILLIN AND TAZOBACTAM 25 GRAM(S): 4; .5 INJECTION, POWDER, LYOPHILIZED, FOR SOLUTION INTRAVENOUS at 19:31

## 2020-04-18 RX ADMIN — Medication 400 MILLIGRAM(S): at 00:47

## 2020-04-18 RX ADMIN — PIPERACILLIN AND TAZOBACTAM 200 GRAM(S): 4; .5 INJECTION, POWDER, LYOPHILIZED, FOR SOLUTION INTRAVENOUS at 10:01

## 2020-04-18 RX ADMIN — PANTOPRAZOLE SODIUM 40 MILLIGRAM(S): 20 TABLET, DELAYED RELEASE ORAL at 19:32

## 2020-04-18 RX ADMIN — Medication 100 MILLIEQUIVALENT(S): at 13:30

## 2020-04-18 RX ADMIN — FLUCONAZOLE 100 MILLIGRAM(S): 150 TABLET ORAL at 21:05

## 2020-04-18 RX ADMIN — PHENYLEPHRINE HYDROCHLORIDE 5.61 MICROGRAM(S)/KG/MIN: 10 INJECTION INTRAVENOUS at 15:23

## 2020-04-18 RX ADMIN — HYDROMORPHONE HYDROCHLORIDE 1 MILLIGRAM(S): 2 INJECTION INTRAMUSCULAR; INTRAVENOUS; SUBCUTANEOUS at 13:28

## 2020-04-18 RX ADMIN — SODIUM CHLORIDE 1000 MILLILITER(S): 9 INJECTION, SOLUTION INTRAVENOUS at 01:18

## 2020-04-18 RX ADMIN — Medication 2.34 MICROGRAM(S)/KG/MIN: at 20:18

## 2020-04-18 RX ADMIN — PHENYLEPHRINE HYDROCHLORIDE 5.61 MICROGRAM(S)/KG/MIN: 10 INJECTION INTRAVENOUS at 08:33

## 2020-04-18 RX ADMIN — Medication 250 MILLIGRAM(S): at 08:59

## 2020-04-18 RX ADMIN — SODIUM CHLORIDE 90 MILLILITER(S): 9 INJECTION, SOLUTION INTRAVENOUS at 02:13

## 2020-04-18 NOTE — ED ADULT NURSE REASSESSMENT NOTE - NS ED NURSE REASSESS COMMENT FT1
Blood transfusion completed. No s/s of blood transfusion. Vital sign stable. Will continue to monitor.

## 2020-04-18 NOTE — PROGRESS NOTE ADULT - SUBJECTIVE AND OBJECTIVE BOX
Surgery post-op  Pt seen at bedside, intubated on 1 pressor    T(C): 36.7 (04-18-20 @ 02:43), Max: 36.7 (04-18-20 @ 02:35)  HR: 55 (04-18-20 @ 10:30) (44 - 92)  BP: 117/77 (04-18-20 @ 10:30) (78/57 - 122/75)  RR: 18 (04-18-20 @ 10:30) (12 - 26)  SpO2: 100% (04-18-20 @ 10:30) (94% - 100%)  Wt(kg): --      04-18 @ 07:01  -  04-18 @ 10:43  --------------------------------------------------------  IN: 2115.7 mL / OUT: 70 mL / NET: 2045.7 mL        Physical Exam  General: AAOx3, No acute distress  Skin: No jaundice, no icterus  Abdomen: midline dressing in place, c/d/i, vicky with serosang output  : becerra in place  Extremities: non edematous, no calf pain bilaterally               labs: no new labs

## 2020-04-18 NOTE — H&P ADULT - ASSESSMENT
83 yo mandarin speaking F with PMH of Afib (eliquis), HTN, RA and no PSx presents to ED from NH for abdominal pain x 1day. Patient is a poor historian but  admits severe pain in the LLQ area started spontaneously yesterday . Pt is slightly hypotensive, PE with very tender LLQ abdomen. Labs with H/H of 6.9/21.8, serum Lactate -3.9. CT shows a large pneumoperitoneum on the Left paracolic gutter and questionable fluid possible blood in same vicinity. Pt will be admitted to surgery for emergent operative management.     -Admit to Surgery under Dr Linder   -OR Emergently for Ex. lap possible SBR and other related procedure .   -NPO, IVF on NPO   -Cont IV ABx  -CXR stat, EKG stat  -DVT PPx 83 yo mandarin speaking F with PMH of Afib (eliquis), HTN, RA and no PSx presents to ED from NH for abdominal pain x 1day. Patient is a poor historian but  admits severe pain in the LLQ area started spontaneously yesterday . Pt is slightly hypotensive, PE with very tender LLQ abdomen. Labs with H/H of 6.9/21.8, serum Lactate -3.9. CT shows a large pneumoperitoneum on the Left paracolic gutter and questionable fluid possible blood in same vicinity. Pt will be admitted to surgery for emergent operative management.     -Admit to Surgery under Dr Linder   -OR Emergently for Ex. lap possible SBR and other related procedure .   -2L LB Boluses  -NPO, IVF on NPO   Preop labs   -Cont IV ABx  -CXR stat, EKG stat  -DVT PPx 85 yo mandarin speaking F with PMH of Afib (eliquis), HTN, RA and no PSx presents to ED from NH for abdominal pain x 1day. Patient is a poor historian but  admits severe pain in the LLQ area started spontaneously yesterday . PE with very tender LLQ abdomen. Labs with H/H of 6.9/21.8, serum Lactate -3.9. CT shows a large pneumoperitoneum on the Left paracolic gutter and questionable fluid possible blood in same vicinity. Pt will be admitted to surgery for emergent operative management.     -Admit to Surgery under Dr Linder   -OR Emergently for Ex. lap possible SBR and other related procedure .   -2L LB Boluses  -NPO, IVF on NPO   Preop labs   -Cont IV ABx  -CXR stat, EKG stat  -DVT PPx

## 2020-04-18 NOTE — CHART NOTE - NSCHARTNOTEFT_GEN_A_CORE
Discussed patient's poor prognosis w/ daughter Marbella anahi Khan - updated patient's grim prognosis.

## 2020-04-18 NOTE — H&P ADULT - HISTORY OF PRESENT ILLNESS
83 yo mandarin speaking F with PMH of Afib (eliquis), HTN, RA and no PSx presents to ED from NH for abdominal pain x 1day. Patient is a poor historian but  admits severe pain in the LLQ area started spontaneously yesterday . Denies nausea, vomiting, trauma or falls. Also denies fever, SOB, cough, sick contacts or recent travels . Denies previous history of similar pain , urinary symptoms or any other complaints. Additional history by daughter (#Ms Erin Stephenson #[phone]) she says she was feeling very well until yesterday when she was told her mother is complaining of severe abdominal pain. No other complaints but daughter wants everything possible to keep her mum alive and patient also agrees. 83 yo mandarin speaking F with PMH of Afib (eliquis), HTN, RA, s/p UGI bleed (2U PRBC last month) and no PSx presents to ED from NH for abdominal pain x 1day. Patient is a poor historian but  admits severe pain in the LLQ area started spontaneously yesterday . Denies nausea, vomiting, trauma or falls. Also denies fever, SOB, cough, sick contacts or recent travels . Denies previous history of similar pain , urinary symptoms or any other complaints.  Additional history by daughter (#Ms Erin Stephenson #[phone]) she says she was feeling very well until yesterday when she was told her mother is complaining of severe abdominal pain. No other complaints but daughter wants everything possible to keep her mum alive and patient also agrees. 85 yo mandarin speaking F with PMH of Afib (eliquis), HTN, RA, s/p UGI bleed (2U PRBC last month) and no PSx presents to ED  for abdominal pain x 1day. Patient is a poor historian but  admits severe pain in the LLQ area started spontaneously yesterday . Denies nausea, vomiting, trauma or falls. Also denies fever, SOB, cough, sick contacts or recent travels . Denies previous history of similar pain , urinary symptoms or any other complaints.  Additional history by daughter (#Ms Erin Stephenson #[phone]) she says she was feeling very well until yesterday when she was told her mother is complaining of severe abdominal pain. No other complaints but daughter wants everything possible to keep her mum alive and patient also agrees.

## 2020-04-18 NOTE — PROGRESS NOTE ADULT - ASSESSMENT
Patient is 84 female admitted with septic shock 2/2 perforated bowel billroth type 2 gastrectomy with gastrojejunostomy.      Assessment and Plan     1. Septic shock 2/2 Bowel perforation   2. Anemia likely bleeding Ulcer   3. Morris   4. Afib   5. Metabolic acidosis  6. Anemia        Neuro   Dementia   patient is sedated with versed; will dc versed today   hold off on precedex for now   dilaudid pushed PRN for agitation     Cardio   Septic shock   on phenylephrine currently; change pheny to levophed   monitor BP      2. Afib   Hold AC post op   patient is hypotensive hold meds  currently rate controlled     Pulmonary   Patient is Intubated s/p surgery   f/u CXR and ABG   c/w mechanical ventilation   12/400/60/5 this morning; changed to 20/450/40/5 based on ABG; will repeat ABG today     GI   Perforated PUD perforated bowel billroth type 2 gastrectomy with gastrojejunostomy.  continue with zosyn abx   IV fluids   pain control   IV protonix   care as per surgery team   NPO for now     Renal   elevated BUN/ CR; improving   likely pre- renal 2/2 septic shock   continue with IV fluids  monitor urine Output     ID   septic shock   continue with Zosyn   s/p one dose of Vanco   f/u Blood cx   ID Dr. Salmon consulted    f/u Lactate     ENDO   Keep -180     heme   anemia Likely due to bleeding PUD   s/p 3 PRBC   keep Hb >8   transfuse as required   monitor CBC     Prophylaxis   Protonix for GI prophylaxis   DVT prophylaxis as per Surgery team clearance ; on SCDs for now

## 2020-04-18 NOTE — CONSULT NOTE ADULT - ATTENDING COMMENTS
Patient seen and examined with resident s/p transfer to ICU from OR. Data reviewed. Case discussed with ICU resident.  This is 84F with PMH as listed above brought to the ICU for post-op monitoring after Billroth type 2 gastrectomy with gastrojejunostomy for perforated gastric ulcer and now in septic shock requiring vasopressor. She received 1PRBC pre-op and 2PRBCs intraop.   VS reviewed. Exam as above. Labs/Imaging reviewed. Hb 6.9 (prior 8.7), NIMA, lactate 5.1 and repeat 3.9, troponin 0.303.   A/P: S/p Billroth type 2 gastrectomy/gastrojejunostomy for perforated gastric ulcer with Septic shock, NIMA, Severe anemia likely from blood loss.  - Continue mechanical ventilation for now, obtain ABG and adjust vent settings appropriately, start adequate sedation and analgesia while on ventilator, titrate vasopressor to MAP >65, start IV Zosyn and IV Vancomycin, blood cultures, monitor renal function, trend troponins, repeat lactate, DVT prophylaxis.   Condition: Critical, prognosis: Guarded.

## 2020-04-18 NOTE — ED ADULT NURSE REASSESSMENT NOTE - NS ED NURSE REASSESS COMMENT FT1
Patient educated on s/s of blood transfusion transaction. Blood transfusion started. RN at bedside. Will continue to monitor.

## 2020-04-18 NOTE — PROGRESS NOTE ADULT - ASSESSMENT
83yo female s/p ex lap, billroth II for gastric perf 4/18    1) dressing to be changed tomorrow  2) pain management  3) cont iv antibx  4) cont icu management

## 2020-04-18 NOTE — PROGRESS NOTE ADULT - SUBJECTIVE AND OBJECTIVE BOX
INTERVAL HPI/OVERNIGHT EVENTS: Patient was seen and examined at bed side. Patient had gastrectomy and gastrojejunostomy this morning. Hgb noted to be low at 6.9 and was given 3 units of PRBCs. Creatinine noted to improve to 1.78 this morning. Currently intubated. DC sedations this morning.      PRESSORS: Yes   WHICH: Phenylephrine     ANTIBIOTICS: zosyn         DATE STARTED:     Antimicrobial:  piperacillin/tazobactam IVPB.. 3.375 Gram(s) IV Intermittent every 12 hours    Cardiovascular:  norepinephrine Infusion 0.05 MICROgram(s)/kG/Min IV Continuous <Continuous>  phenylephrine    Infusion 0.3 MICROgram(s)/kG/Min IV Continuous <Continuous>    Other:  chlorhexidine 0.12% Liquid 15 milliLiter(s) Oral Mucosa every 12 hours  HYDROmorphone  Injectable 1 milliGRAM(s) IV Push every 4 hours PRN  lactated ringers. 1000 milliLiter(s) IV Continuous <Continuous>  pantoprazole  Injectable 40 milliGRAM(s) IV Push two times a day    chlorhexidine 0.12% Liquid 15 milliLiter(s) Oral Mucosa every 12 hours  HYDROmorphone  Injectable 1 milliGRAM(s) IV Push every 4 hours PRN  lactated ringers. 1000 milliLiter(s) IV Continuous <Continuous>  norepinephrine Infusion 0.05 MICROgram(s)/kG/Min IV Continuous <Continuous>  pantoprazole  Injectable 40 milliGRAM(s) IV Push two times a day  phenylephrine    Infusion 0.3 MICROgram(s)/kG/Min IV Continuous <Continuous>  piperacillin/tazobactam IVPB.. 3.375 Gram(s) IV Intermittent every 12 hours    Drug Dosing Weight  Height (cm): 165.1 (2020 06:47)  Weight (kg): 57.4 (2020 07:10)  BMI (kg/m2): 21.1 (2020 07:10)  BSA (m2): 1.63 (2020 07:10)    CENTRAL LINE: No    HUERTAS: Yes    DATE INSERTED:     A-LINE: No      ICU Vital Signs Last 24 Hrs  T(C): 36.7 (2020 02:43), Max: 36.7 (2020 02:35)  T(F): 98 (2020 02:43), Max: 98 (2020 02:35)  HR: 59 (2020 11:30) (44 - 92)  BP: 109/56 (2020 11:30) (78/57 - 138/93)  BP(mean): 61 (2020 11:30) (59 - 105)  RR: 18 (2020 11:30) (12 - 26)  SpO2: 100% (2020 11:30) (94% - 100%)      ABG - ( 2020 11:06 )  pH, Arterial: 7.04  pH, Blood: x     /  pCO2: 39    /  pO2: 152   / HCO3: 10    / Base Excess: NR    /  SaO2: NR          Mode: AC/ CMV (Assist Control/ Continuous Mandatory Ventilation)  RR (machine): 12  TV (machine): 400  FiO2: 60  PEEP: 5  ITime: 1  MAP: 11  PIP: 26      PHYSICAL EXAM:  GENERAL: Intubated; able to open eyes upon verbal stimulation   HEAD: Atraumatic, Normocephalic  EYES: PERRLA, conjunctiva and sclera clear  ENMT: No tonsillar erythema, exudates, or enlargement; Moist mucous membranes  NECK: Supple, normal appearance  NERVOUS SYSTEM: Intubated; able to open eyes to verbal stimulation  CHEST/LUNG: No chest deformity; No rales, rhonchi, wheezing   HEART: Irregular rate and rhythm; No murmurs   ABDOMEN: s/p gastrectomy and gastrojejunostomy   EXTREMITIES: No clubbing, cyanosis, or edema  LYMPH: No lymphadenopathy noted  SKIN: No rashes or lesions; Good capillary refill      LABS:  CBC Full  -  ( 2020 10:49 )  WBC Count : 4.00 K/uL  RBC Count : 4.34 M/uL  Hemoglobin : 12.5 g/dL  Hematocrit : 39.1 %  Platelet Count - Automated : 181 K/uL  Mean Cell Volume : 90.1 fl  Mean Cell Hemoglobin : 28.8 pg  Mean Cell Hemoglobin Concentration : 32.0 gm/dL  Auto Neutrophil # : 2.88 K/uL  Auto Lymphocyte # : 0.81 K/uL  Auto Monocyte # : 0.08 K/uL  Auto Eosinophil # : 0.15 K/uL  Auto Basophil # : 0.02 K/uL  Auto Neutrophil % : 71.9 %  Auto Lymphocyte % : 20.3 %  Auto Monocyte % : 2.0 %  Auto Eosinophil % : 3.8 %  Auto Basophil % : 0.5 %    18    137  |  108  |  64<H>  ----------------------------<  169<H>  4.9   |  13<L>  |  1.79<H>    Ca    6.7<L>      2020 10:49    TPro  3.7<L>  /  Alb  1.4<L>  /  TBili  1.0  /  DBili  x   /  AST  116<H>  /  ALT  56  /  AlkPhos  40  04-18    PT/INR - ( 2020 23:38 )   PT: 11.4 sec;   INR: 1.01 ratio         PTT - ( 2020 23:38 )  PTT:21.4 sec  Urinalysis Basic - ( 2020 22:54 )    Color: Yellow / Appearance: Clear / S.015 / pH: x  Gluc: x / Ketone: Negative  / Bili: Negative / Urobili: Negative   Blood: x / Protein: Negative / Nitrite: Negative   Leuk Esterase: Negative / RBC: x / WBC x   Sq Epi: x / Non Sq Epi: x / Bacteria: x      RADIOLOGY & ADDITIONAL STUDIES REVIEWED: < from: Xray Chest 1 View- PORTABLE-Urgent (20 @ 07:38) >  IMPRESSION: Pulmonary edema and bilateral pleural effusions are increased    < end of copied text >    GOALS OF CARE DISCUSSION WITH PATIENT/FAMILY/PROXY: full code     CRITICAL CARE TIME SPENT: 35 minutes

## 2020-04-18 NOTE — CONSULT NOTE ADULT - SUBJECTIVE AND OBJECTIVE BOX
HPI:  85 yo mandarin speaking F with PMH of Afib (eliquis), HTN, RA, s/p UGI bleed (2U PRBC last month) and no PSx presents to ED  for abdominal pain x 1day. Patient is a poor historian but  admits severe pain in the LLQ area started spontaneously yesterday . Denies nausea, vomiting, trauma or falls. Also denies fever, SOB, cough, sick contacts or recent travels . Denies previous history of similar pain , urinary symptoms or any other complaints.  Additional history by daughter (#Ms Erin Stephenson #[phone]) she says she was feeling very well until yesterday when she was told her mother is complaining of severe abdominal pain. No other complaints but daughter wants everything possible to keep her mum alive and patient also agrees. (2020 00:51)      PAST MEDICAL & SURGICAL HISTORY:  Atrial fibrillation  Rheumatoid arthritis  Hypertension  Arthritis  No significant past surgical history      No Known Allergies      Meds:  chlorhexidine 0.12% Liquid 15 milliLiter(s) Oral Mucosa every 12 hours  HYDROmorphone  Injectable 1 milliGRAM(s) IV Push every 4 hours PRN  lactated ringers. 1000 milliLiter(s) IV Continuous <Continuous>  norepinephrine Infusion 0.05 MICROgram(s)/kG/Min IV Continuous <Continuous>  pantoprazole  Injectable 40 milliGRAM(s) IV Push two times a day  phenylephrine    Infusion 0.3 MICROgram(s)/kG/Min IV Continuous <Continuous>  piperacillin/tazobactam IVPB.. 3.375 Gram(s) IV Intermittent every 12 hours      SOCIAL HISTORY:  Smoker:  YES / NO        PACK YEARS:                         WHEN QUIT?  ETOH use:  YES / NO               FREQUENCY / QUANTITY:  Ilicit Drug use:  YES / NO  Occupation:  Assisted device use (Cane / Walker):  Live with:    FAMILY HISTORY:      VITALS:  Vital Signs Last 24 Hrs  T(C): 33.2 (2020 16:00), Max: 36.7 (2020 02:35)  T(F): 91.7 (2020 16:00), Max: 98 (2020 02:35)  HR: 65 (2020 17:00) (44 - 92)  BP: 105/60 (2020 17:00) (59/39 - 138/93)  BP(mean): 72 (2020 17:00) (43 - 105)  RR: 16 (2020 17:00) (10 - 26)  SpO2: 99% (2020 17:00) (94% - 100%)    LABS/DIAGNOSTIC TESTS:                          11.5   2.70  )-----------( 147      ( 2020 13:58 )             35.2     WBC Count: 2.70 K/uL ( @ 13:58)  WBC Count: 4.00 K/uL ( @ 10:49)  WBC Count: 3.71 K/uL ( @ 23:38)  WBC Count: 5.49 K/uL ( @ 21:33)          137  |  108  |  64<H>  ----------------------------<  169<H>  4.9   |  13<L>  |  1.79<H>    Ca    6.7<L>      2020 10:49    TPro  3.7<L>  /  Alb  1.4<L>  /  TBili  1.0  /  DBili  x   /  AST  116<H>  /  ALT  56  /  AlkPhos  40        Urinalysis Basic - ( 2020 22:54 )    Color: Yellow / Appearance: Clear / S.015 / pH: x  Gluc: x / Ketone: Negative  / Bili: Negative / Urobili: Negative   Blood: x / Protein: Negative / Nitrite: Negative   Leuk Esterase: Negative / RBC: x / WBC x   Sq Epi: x / Non Sq Epi: x / Bacteria: x        LIVER FUNCTIONS - ( 2020 10:49 )  Alb: 1.4 g/dL / Pro: 3.7 g/dL / ALK PHOS: 40 U/L / ALT: 56 U/L DA / AST: 116 U/L / GGT: x             PT/INR - ( 2020 13:58 )   PT: 12.9 sec;   INR: 1.14 ratio         PTT - ( 2020 13:58 )  PTT:28.8 sec    LACTATE:Lactate, Blood: 5.9 mmol/L ( @ 13:58)  Lactate, Blood: 3.9 mmol/L ( @ 23:38)  Lactate, Blood: 5.1 mmol/L ( @ 21:33)      ABG - ABG - ( 2020 14:14 )  pH, Arterial: 7.22  pH, Blood: x     /  pCO2: 33    /  pO2: 93    / HCO3: 13    / Base Excess: -13.7 /  SaO2: 97                  CULTURES:       RADIOLOGY:< from: CT Abdomen and Pelvis No Cont (20 @ 22:22) >  EXAM:  CT ABDOMEN AND PELVIS                            PROCEDURE DATE:  2020          INTERPRETATION:  CLINICAL INFORMATION: Abdominal pain    COMPARISON: 3/14/2020    PROCEDURE:   CT of the Abdomen and Pelvis was performed without intravenous contrast.   Intravenous contrast: None.  Oral contrast: None.  Sagittal and coronal reformats were performed.    FINDINGS:    LOWER CHEST: Within normal limits.    LIVER: Within normal limits.  BILE DUCTS: Normal caliber.  GALLBLADDER: Hyperdense bile. No wall thickening or stones.  SPLEEN: Within normal limits.  PANCREAS: Within normal limits.  ADRENALS: Within normal limits.  KIDNEYS/URETERS: 8 mm sized exophytic left renal cyst.    BLADDER: Within normal limits.  REPRODUCTIVE ORGANS: Within normal limits.    BOWEL: No bowel obstruction. Appendix is normal.  PERITONEUM: Large amount of pneumoperitoneum predominantly in the left hemiabdomen. Scattered extraluminal air seen around the greater curvature of proximal stomach and jejunal loops in the left upper quadrant (4-38). Diffuse fat stranding in the left mid abdomen (2-66). Moderate ascites. Slightly hyperdense content in the left paracolic gutter (2-48)-either from bowel content or blood component.  VESSELS: Advanced atheroscleroticdisease.  RETROPERITONEUM/LYMPH NODES: No lymphadenopathy.    ABDOMINAL WALL: Within normal limits.  BONES: Severe degenerative change and osteopenia with known severe compression of L1. No acute abnormality.    IMPRESSION:     Large amount of pneumoperitoneum and moderate ascites. Small areas of hyperdense content in the left paracolic gutter-bowel content versus blood component.    Diffuse mesenteric stranding in the left mid abdomen with scattered air around the stomach and jejunum.    Major findings were discussed by Dr. Rooney with Dr. Rodriguez with read back at 10:50 PM.          VIKAS ROONEY   This document has been electronically signed. 2020 10:53PM        ----------------------------------------------------------------------------------------------------------------------------------------------------------------------------------------------    < from: Xray Chest 1 View- PORTABLE-Urgent (20 @ 07:38) >  EXAM:  XR CHEST PORTABLE URGENT 1V                            PROCEDURE DATE:  2020          INTERPRETATION:  XR CHEST URGENT    Single AP view    HISTORY:  central line placement    Comparison: Chest x-ray one day prior      The tip of the ET tube is above the cece.    The NG tube is in the stomach.    Right IJ line tip in the SVC.    The cardiac silhouette is enlarged. Pulmonary edema. Small bilateral pleural effusions.    IMPRESSION: Pulmonary edema and bilateral pleural effusions are increased                BRANDYSHAYLA SHUKLA M.D., ATTENDING RADIOLOGIST  This document has been electronically signed. 2020  9:35AM              < end of copied text >      ROS  [  ] UNABLE TO ELICIT HPI:  83 yo mandarin speaking F with PMH of Afib (eliquis), HTN, RA, s/p UGI bleed (2U PRBC last month) and no PSx presents to ED  for abdominal pain x 1day. Patient is a poor historian but  admits severe pain in the LLQ area started spontaneously yesterday . Denies nausea, vomiting, trauma or falls. Also denies fever, SOB, cough, sick contacts or recent travels . Denies previous history of similar pain , urinary symptoms or any other complaints.  Additional history by daughter (#Ms Erin Stephenson #[phone]) she says she was feeling very well until yesterday when she was told her mother is complaining of severe abdominal pain. No other complaints but daughter wants everything possible to keep her mum alive and patient also agrees. (2020 00:51)    History as above, pt who presented in septic shock secondary to gastric perforation and underwent emergency partial gastrectomy and gastroduodenal bypass, the pt has severe peritonitis. She is in the ICU on 2 pressors, sedated , intubated and vent dependent. She is on Zosyn for her infection.     PAST MEDICAL & SURGICAL HISTORY:  Atrial fibrillation  Rheumatoid arthritis  Hypertension  Arthritis  No significant past surgical history      No Known Allergies      Meds:  chlorhexidine 0.12% Liquid 15 milliLiter(s) Oral Mucosa every 12 hours  HYDROmorphone  Injectable 1 milliGRAM(s) IV Push every 4 hours PRN  lactated ringers. 1000 milliLiter(s) IV Continuous <Continuous>  norepinephrine Infusion 0.05 MICROgram(s)/kG/Min IV Continuous <Continuous>  pantoprazole  Injectable 40 milliGRAM(s) IV Push two times a day  phenylephrine    Infusion 0.3 MICROgram(s)/kG/Min IV Continuous <Continuous>  piperacillin/tazobactam IVPB.. 3.375 Gram(s) IV Intermittent every 12 hours      SOCIAL HISTORY: unknown    FAMILY HISTORY: unknown      VITALS:  Vital Signs Last 24 Hrs  T(C): 33.2 (2020 16:00), Max: 36.7 (2020 02:35)  T(F): 91.7 (2020 16:00), Max: 98 (2020 02:35)  HR: 65 (2020 17:00) (44 - 92)  BP: 105/60 (2020 17:00) (59/39 - 138/93)  BP(mean): 72 (2020 17:00) (43 - 105)  RR: 16 (2020 17:00) (10 - 26)  SpO2: 99% (2020 17:00) (94% - 100%)    LABS/DIAGNOSTIC TESTS:                          11.5   2.70  )-----------( 147      ( 2020 13:58 )             35.2     WBC Count: 2.70 K/uL ( @ 13:58)  WBC Count: 4.00 K/uL ( @ 10:49)  WBC Count: 3.71 K/uL ( @ 23:38)  WBC Count: 5.49 K/uL ( @ 21:33)          137  |  108  |  64<H>  ----------------------------<  169<H>  4.9   |  13<L>  |  1.79<H>    Ca    6.7<L>      2020 10:49    TPro  3.7<L>  /  Alb  1.4<L>  /  TBili  1.0  /  DBili  x   /  AST  116<H>  /  ALT  56  /  AlkPhos  40        Urinalysis Basic - ( 2020 22:54 )    Color: Yellow / Appearance: Clear / S.015 / pH: x  Gluc: x / Ketone: Negative  / Bili: Negative / Urobili: Negative   Blood: x / Protein: Negative / Nitrite: Negative   Leuk Esterase: Negative / RBC: x / WBC x   Sq Epi: x / Non Sq Epi: x / Bacteria: x        LIVER FUNCTIONS - ( 2020 10:49 )  Alb: 1.4 g/dL / Pro: 3.7 g/dL / ALK PHOS: 40 U/L / ALT: 56 U/L DA / AST: 116 U/L / GGT: x             PT/INR - ( 2020 13:58 )   PT: 12.9 sec;   INR: 1.14 ratio         PTT - ( 2020 13:58 )  PTT:28.8 sec    LACTATE:Lactate, Blood: 5.9 mmol/L ( @ 13:58)  Lactate, Blood: 3.9 mmol/L ( @ 23:38)  Lactate, Blood: 5.1 mmol/L ( @ 21:33)      ABG - ABG - ( 2020 14:14 )  pH, Arterial: 7.22  pH, Blood: x     /  pCO2: 33    /  pO2: 93    / HCO3: 13    / Base Excess: -13.7 /  SaO2: 97                  CULTURES:       RADIOLOGY:< from: CT Abdomen and Pelvis No Cont (20 @ 22:22) >  EXAM:  CT ABDOMEN AND PELVIS                            PROCEDURE DATE:  2020          INTERPRETATION:  CLINICAL INFORMATION: Abdominal pain    COMPARISON: 3/14/2020    PROCEDURE:   CT of the Abdomen and Pelvis was performed without intravenous contrast.   Intravenous contrast: None.  Oral contrast: None.  Sagittal and coronal reformats were performed.    FINDINGS:    LOWER CHEST: Within normal limits.    LIVER: Within normal limits.  BILE DUCTS: Normal caliber.  GALLBLADDER: Hyperdense bile. No wall thickening or stones.  SPLEEN: Within normal limits.  PANCREAS: Within normal limits.  ADRENALS: Within normal limits.  KIDNEYS/URETERS: 8 mm sized exophytic left renal cyst.    BLADDER: Within normal limits.  REPRODUCTIVE ORGANS: Within normal limits.    BOWEL: No bowel obstruction. Appendix is normal.  PERITONEUM: Large amount of pneumoperitoneum predominantly in the left hemiabdomen. Scattered extraluminal air seen around the greater curvature of proximal stomach and jejunal loops in the left upper quadrant (4-38). Diffuse fat stranding in the left mid abdomen (2-66). Moderate ascites. Slightly hyperdense content in the left paracolic gutter (2-48)-either from bowel content or blood component.  VESSELS: Advanced atheroscleroticdisease.  RETROPERITONEUM/LYMPH NODES: No lymphadenopathy.    ABDOMINAL WALL: Within normal limits.  BONES: Severe degenerative change and osteopenia with known severe compression of L1. No acute abnormality.    IMPRESSION:     Large amount of pneumoperitoneum and moderate ascites. Small areas of hyperdense content in the left paracolic gutter-bowel content versus blood component.    Diffuse mesenteric stranding in the left mid abdomen with scattered air around the stomach and jejunum.    Major findings were discussed by Dr. Rooney with Dr. Rodriguez with read back at 10:50 PM.          VIKAS ROONEY   This document has been electronically signed. 2020 10:53PM        ----------------------------------------------------------------------------------------------------------------------------------------------------------------------------------------------    < from: Xray Chest 1 View- PORTABLE-Urgent (20 @ 07:38) >  EXAM:  XR CHEST PORTABLE URGENT 1V                            PROCEDURE DATE:  2020          INTERPRETATION:  XR CHEST URGENT    Single AP view    HISTORY:  central line placement    Comparison: Chest x-ray one day prior      The tip of the ET tube is above the cece.    The NG tube is in the stomach.    Right IJ line tip in the SVC.    The cardiac silhouette is enlarged. Pulmonary edema. Small bilateral pleural effusions.    IMPRESSION: Pulmonary edema and bilateral pleural effusions are increased                BRANDY SHUKLA M.D., ATTENDING RADIOLOGIST  This document has been electronically signed. 2020  9:35AM              < end of copied text >      ROS  [ x ] UNABLE TO ELICIT

## 2020-04-18 NOTE — DISCHARGE NOTE FOR THE EXPIRED PATIENT - HOSPITAL COURSE
83 yo mandarin speaking F with PMH of Afib (eliquis), HTN, RA, s/p UGI bleed (2U PRBC last month) and no PSx presents to ED  for abdominal pain x 1day.   Large amount of pneumoperitoneum and moderate ascites was noted on CT abdomen. 83 yo mandarin speaking F with PMH of Afib (eliquis), HTN, RA, s/p UGI bleed (2U PRBC last month) and no PSx presents to ED  for abdominal pain x 1day.   Large amount of pneumoperitoneum and moderate ascites was noted on CT abdomen. Pt was admitted to surgery for emergent procedure.  Distal gastrectomy, with gastrojejunostomy was done after gastric perforation was noted. Pt was admitted to ICU for post op monitoring and septic shock secondary to bowel perforation. Pt was started on IV Fluconazole and Zosyn. Pt was kept on levophed and and phenylephrine for pressor support. Pt was bradycardic. She was on Dopamine initially, later changed to dobutamine. Pt was persistently bradycardic. Adequate IV hydration was given.   Code blue  was called at  11: 18 pm as asystole was noted on telemetry and no pulse was found. Immediate CPR was started with chest compression. 1 dose of epinephrine was given at 11:20 pm. Asystole was noted and compression was continued. Another dose of 1 mg epinephrine was given at 11:24 pm. Pt was still asystole and no pulse noted.  Pt was declared dead at 11:26 pm. Family was informed and condolences given. 83 yo mandarin speaking F with PMH of Afib (eliquis), HTN, RA, s/p UGI bleed (2U PRBC last month) and no PSx presents to ED  for abdominal pain x 1day.   Large amount of pneumoperitoneum and moderate ascites was noted on CT abdomen. Pt was admitted to surgery for emergent procedure.  Distal gastrectomy, with gastrojejunostomy was done after gastric perforation was noted. Pt was admitted to ICU for post op monitoring and septic shock secondary to bowel perforation. Pt was started on IV Fluconazole and Zosyn. Pt was kept on levophed and and phenylephrine for pressor support. Pt was bradycardic. She was on Dopamine initially, later changed to dobutamine. Pt was persistently bradycardic. Adequate IV hydration was given.   Code blue  was called at  11: 18 pm as asystole was noted on telemetry and no pulse was found. Immediate CPR was started with chest compression. 1 dose of epinephrine was given at 11:20 pm. Asystole was noted and compression was continued. Another dose of 1 mg epinephrine was given at 11:24 pm. Pt was still asystole and no pulse noted.  Pt was declared dead at 11:26 pm. Family was informed and condolences given.  Medical Examiner was called and no further investigation required.

## 2020-04-18 NOTE — CONSULT NOTE ADULT - ASSESSMENT
Septic Shock  Peritonitis  Leukopenia    Plan Cont Zosyn 3.375gms iv q8hrs  cont pressure support  cont vent support  prognosis guarded.

## 2020-04-18 NOTE — BRIEF OPERATIVE NOTE - OPERATION/FINDINGS
1u pRBC preop  2u pRBC intraop  Peritoneum with air and gastric juice and food contents  Perforated gastric ulcer, ~2cm, at the antrum proximal to pyloric sphincter  s/p distal gastrectomy with anastomosis to SB

## 2020-04-18 NOTE — CHART NOTE - NSCHARTNOTEFT_GEN_A_CORE
CODE BLUE was called at  11: 18 pm as asystole was noted on telemetry and no pulse was found. Immediate CPR was started with chest compression. 1 dose of epinephrine was given at 11:20 pm. Asystole was noted and compression was continued. Another dose of 1 mg epinephrine was given at 11:24 pm. Pt was still asystole and no pulse noted.  Pt was declared dead at 11:26 pm. Family was informed and condolences given.

## 2020-04-18 NOTE — PROGRESS NOTE ADULT - ATTENDING COMMENTS
Assessment:  1. Acute respiratory failure  2. Perforated gastric ulcer s/p distal gastrectomy with anastomosis to small bowel  3. Acute renal failure  4. Lactic acidosis   5. Acute blood loss anemia   6. Atrial fibrillation   7. Shock     Plan  - Cont. mechanical ventilation with lung protective strategy  - IV fluid resuscitation  - Monitor urine output closely  - IV antibiotics  - F/u culture results  - NPO for now  - Surgery follow up   - Repeat labs in PM  - Protonix BID for now  - Hold all antihypertensives  - Vasopressor support to maintain MAP >65  - Pain control   - Weaning trial once awake  - SCD for DVT prophylaxis

## 2020-04-18 NOTE — H&P ADULT - NSHPPHYSICALEXAM_GEN_ALL_CORE
Vital Signs Last 24 Hrs  T(C): 36.5 (18 Apr 2020 00:48), Max: 36.5 (18 Apr 2020 00:48)  T(F): 97.7 (18 Apr 2020 00:48), Max: 97.7 (18 Apr 2020 00:48)  HR: 80 (18 Apr 2020 00:48) (74 - 92)  BP: 93/50 (18 Apr 2020 00:48) (88/64 - 107/96)  BP(mean): --  RR: 19 (18 Apr 2020 00:48) (19 - 26)  SpO2: 98% (18 Apr 2020 00:48) (95% - 100%)    General:  A&Ox3,Appears stated age, No acute distress,  Head: NC/AT  EENT: PERRLA. EOMI. Conjunctiva and sclera clear. Pharynx clear.  Neck: Supple. No JVD  Lungs: CTA B/l. Nonlabored Respirations  CV: +S1S2, RRR  Abdomen: Soft, Nondistended very tender to palpation in the LLQ /suprapubic area + guarding, no rebound  Extremities: Warm and well perfused. 2+ peripheral pulses b/l. Calf soft, nontender b/l. No pedal edema.

## 2020-04-18 NOTE — PROCEDURE NOTE - NSPROCDETAILS_GEN_ALL_CORE
connected to a pressurized flush line/location identified, draped/prepped, sterile technique used, needle inserted/introduced/positive blood return obtained via catheter/sutured in place/hemostasis with direct pressure, dressing applied/Seldinger technique/ultrasound guidance

## 2020-04-18 NOTE — ED ADULT NURSE REASSESSMENT NOTE - NS ED NURSE REASSESS COMMENT FT1
Fifteen minutes post blood transfusion noted. No s/s of blood transfusion. RN at bedside. Will continue to monitor.

## 2020-04-18 NOTE — H&P ADULT - ATTENDING COMMENTS
pt with acute abdomen and cat scan with pneumoperitoneum  Cat scan reviewed  Had a long d/w the pt and the daughter  Needs explorative laparotomy. She was here 1 month ago and had a scope with gastritis and also Had UGI Bleed  Discussed with them the potential for high mortality with the surgery  May need patching, bowel resection, ostomy and other related procedures were discussed  They want to go ahead with the surgery  High mortality with the surgery was also discussed

## 2020-04-18 NOTE — H&P ADULT - NSHPLABSRESULTS_GEN_ALL_CORE
6.9    3.71  )-----------( 229      ( 17 Apr 2020 23:38 )             21.8   04-17    139  |  112<H>  |  76<H>  ----------------------------<  118<H>  4.2   |  17<L>  |  1.78<H>    Ca    7.8<L>      17 Apr 2020 23:38    TPro  4.3<L>  /  Alb  1.9<L>  /  TBili  0.4  /  DBili  x   /  AST  29  /  ALT  16  /  AlkPhos  37<L>  04-17    < from: CT Abdomen and Pelvis No Cont (04.17.20 @ 22:22) >      IMPRESSION:     Large amount of pneumoperitoneum and moderate ascites. Small areas of hyperdense content in the left paracolic gutter-bowel content versus blood component.    Diffuse mesenteric stranding in the left mid abdomen with scattered air around the stomach and jejunum.    Major findings were discussed by Dr. Rooney with Dr. Rodriguez with read back at 10:50 PM.    < end of copied text >

## 2020-04-18 NOTE — CONSULT NOTE ADULT - RS GEN PE MLT RESP DETAILS PC
breath sounds equal/no rales/no wheezes/good air movement/clear to auscultation bilaterally/no rhonchi

## 2020-04-18 NOTE — CONSULT NOTE ADULT - SUBJECTIVE AND OBJECTIVE BOX
HPI:  85 yo mandarin speaking F with PMH of Afib (eliquis), HTN, RA, s/p UGI bleed (2U PRBC last month) and no PSx presents to ED from [facility] for abdominal pain x 1day. Patient is a poor historian but  admits severe pain in the LLQ area started spontaneously yesterday . Denies nausea, vomiting, trauma or falls. Also denies fever, SOB, cough, sick contacts or recent travels . Denies previous history of similar pain , urinary symptoms or any other complaints.  Additional history by daughter (#Ms Erin Stephenson #[phone]) she says she was feeling very well until yesterday when she was told her mother is complaining of severe abdominal pain. No other complaints but daughter wants everything possible to keep her mum alive and patient also agrees. (18 Apr 2020 00:51)    Interval History: Patient with H/o of PUD with multiple Ulcers diagnosed 1 month ago upon upper Endoscopy. Patient came with acute abdominal pain and hypotension , CT abd showed Large Pneumoperitoneum. patient admitted with Septic shock 2/2 bowel perforation.  Patient underwent billroth type 2 gastrectomy with gastrojejunostomy, pt received 1 pRBC pre-op  During surgery patient received 2 PRBC and 4 L NS. Patient was admitted to ICU for post op care. Patient is sedated and Intubated.     MEDICATIONS  (STANDING):  HYDROmorphone Infusion 1 mG/Hr (1 mL/Hr) IV Continuous <Continuous>  midazolam Infusion 0.02 mG/kG/Hr (1 mL/Hr) IV Continuous <Continuous>  phenylephrine    Infusion 0.3 MICROgram(s)/kG/Min (5.61 mL/Hr) IV Continuous <Continuous>    MEDICATIONS  (PRN):    PAST MEDICAL & SURGICAL HISTORY:  Atrial fibrillation  Rheumatoid arthritis  Hypertension  Arthritis  No significant past surgical history      REVIEW OF SYSTEMS:  Unable to provide     Vital Signs Last 24 Hrs  T(C): 36.7 (18 Apr 2020 02:43), Max: 36.7 (18 Apr 2020 02:35)  T(F): 98 (18 Apr 2020 02:43), Max: 98 (18 Apr 2020 02:35)  HR: 58 (18 Apr 2020 02:43) (56 - 92)  BP: 122/75 (18 Apr 2020 02:43) (87/56 - 122/75)  BP(mean): --  RR: 19 (18 Apr 2020 02:43) (18 - 26)  SpO2: 98% (18 Apr 2020 02:43) (95% - 100%)     PHYSICAL EXAM:  GENERAL: sedated   HEAD:  Atraumatic, Normocephalic  EYES: onjunctiva and sclera clear  NECK: Supple, No JVD  CHEST/LUNG: Clear to auscultation bilaterally; ETT in place   HEART: Regular rate and rhythm; No murmurs;   ABDOMEN: Soft, distended, dressing and drain in place   EXTREMITIES:  2+ Peripheral Pulses, No cyanosis or edema  NEUROLOGY: Sedated   SKIN: No rashes or lesions                          6.9    3.71  )-----------( 229      ( 17 Apr 2020 23:38 )             21.8     04-17    139  |  112<H>  |  76<H>  ----------------------------<  118<H>  4.2   |  17<L>  |  1.78<H>    Ca    7.8<L>      17 Apr 2020 23:38    TPro  4.3<L>  /  Alb  1.9<L>  /  TBili  0.4  /  DBili  x   /  AST  29  /  ALT  16  /  AlkPhos  37<L>  04-17

## 2020-04-18 NOTE — CONSULT NOTE ADULT - ASSESSMENT
Patient is 84 female admitted with septic shock 2/2 perforated bowel billroth type 2 gastrectomy with gastrojejunostomy.      Assessment and Plan     1. Septic shock 2/2 Bowel perforation   2. Anemia likely bleeding Ulcer   3. Morris   4. Afib   5. Metabolic acidosis       Neuro   Dementia   patient is sedated on MV   continue with propofol     Cardio   Septic shock   continue with vasopressors     2. Afib   Hold AC post op   patient is hypotensive hold meds  currently rate controlled     Pulmonary   Patient is Intubated s/p surgery   f/u CXR and ABG   c/w mechanical ventilation     GI   Perforated PUD perforated bowel billroth type 2 gastrectomy with gastrojejunostomy.  continue with ABx   IV fluids   pain control   IV protonix   care as per surgery team   NPO for now     Renal   elevated BUN/ CR   likely pre- renal 2/2 septic shock   continue with IV fluids  monitor urine Output     ID   septic shock   continue with Zosyn   s/p one dose of Vanco   f/u Blood cx   consult ID     ENDO   Keep -180     Prophylaxis   Protonix for GI prophylaxis   DVT prophylaxis as per Surgery team clearance Patient is 84 female admitted with septic shock 2/2 perforated bowel billroth type 2 gastrectomy with gastrojejunostomy.      Assessment and Plan     1. Septic shock 2/2 Bowel perforation   2. Anemia likely bleeding Ulcer   3. Morris   4. Afib   5. Metabolic acidosis       Neuro   Dementia   patient is sedated on MV   continue with propofol     Cardio   Septic shock   continue with vasopressors     2. Afib   Hold AC post op   patient is hypotensive hold meds  currently rate controlled     Pulmonary   Patient is Intubated s/p surgery   f/u CXR and ABG   c/w mechanical ventilation     GI   Perforated PUD perforated bowel billroth type 2 gastrectomy with gastrojejunostomy.  continue with ABx   IV fluids   pain control   IV protonix   care as per surgery team   NPO for now     Renal   elevated BUN/ CR   likely pre- renal 2/2 septic shock   continue with IV fluids  monitor urine Output     ID   septic shock   continue with Zosyn   s/p one dose of Vanco   f/u Blood cx   consult ID     ENDO   Keep -180     heme   anemia Likely due to bleeding PUD   s/p 3 PRBC   keep Hb >8   transfuse as required     Prophylaxis   Protonix for GI prophylaxis   DVT prophylaxis as per Surgery team clearance Patient is 84 female admitted with septic shock 2/2 perforated bowel billroth type 2 gastrectomy with gastrojejunostomy.      Assessment and Plan     1. Septic shock 2/2 Bowel perforation   2. Anemia likely bleeding Ulcer   3. Morris   4. Afib   5. Metabolic acidosis  6. Anemia        Neuro   Dementia   patient is sedated on MV   continue with propofol     Cardio   Septic shock   continue with vasopressors     2. Afib   Hold AC post op   patient is hypotensive hold meds  currently rate controlled     Pulmonary   Patient is Intubated s/p surgery   f/u CXR and ABG   c/w mechanical ventilation     GI   Perforated PUD perforated bowel billroth type 2 gastrectomy with gastrojejunostomy.  continue with ABx   IV fluids   pain control   IV protonix   care as per surgery team   NPO for now     Renal   elevated BUN/ CR   likely pre- renal 2/2 septic shock   continue with IV fluids  monitor urine Output     ID   septic shock   continue with Zosyn   s/p one dose of Vanco   f/u Blood cx   consult ID   f/u Lactate     ENDO   Keep -180     heme   anemia Likely due to bleeding PUD   s/p 3 PRBC   keep Hb >8   transfuse as required     Prophylaxis   Protonix for GI prophylaxis   DVT prophylaxis as per Surgery team clearance

## 2020-04-19 LAB
CULTURE RESULTS: SIGNIFICANT CHANGE UP
SPECIMEN SOURCE: SIGNIFICANT CHANGE UP

## 2020-04-22 LAB — SURGICAL PATHOLOGY STUDY: SIGNIFICANT CHANGE UP

## 2020-04-23 LAB
CULTURE RESULTS: SIGNIFICANT CHANGE UP
SPECIMEN SOURCE: SIGNIFICANT CHANGE UP

## 2020-05-03 NOTE — CHART NOTE - NSCHARTNOTEFT_GEN_A_CORE
pt was septic on admission  Before start of the surgery pt had to be stabilised. She was hypotensive and was given blood preop  Pt was resuscitated and lined up before start of the case. Pt also needed pressor support  Pt also had peritonitis with contamination from perforation of gastric ulcer

## 2020-12-10 NOTE — DISCHARGE NOTE FOR THE EXPIRED PATIENT - NS PATIENT DEATH CRITERIA
Patient is dead based upon Brain Death Criteria/Patient is dead based on Cardiopulmonary criteria including absent breath sounds, pulselessness and/or asystole
Additional Progress Note...

## 2024-06-13 NOTE — ED ADULT TRIAGE NOTE - HEIGHT IN FEET
5
Detail Level: Detailed
Quality 226: Preventive Care And Screening: Tobacco Use: Screening And Cessation Intervention: Patient screened for tobacco use and is an ex/non-smoker

## 2025-04-26 NOTE — PHYSICAL THERAPY INITIAL EVALUATION ADULT - PATIENT PROFILE REVIEW, REHAB EVAL
EMR, Laboratory and Radiology results reviewed/yes Pt presents to ed via ems with c/o abdominal pain with n/v, cp & headache since march 2nd. Pt recently had live birth on march 2 at 19 weeks pregnant. Pt states she vomited bloody emesis this morning.

## 2025-04-30 NOTE — ED PROVIDER NOTE - PR
I spoke with patient, she states she has some blackish colored spotting when she wiped approx 5 AM today. Nothing since then. Patient denies vaginal bleeding, cramping, fluid leaking & has + fetal movement. Patient denies vaginal odor or discomfort, denies recent intercourse. Advised patient to continue to monitor symptoms, call office back or go to L & D if contractions, vaginal bleeding or decreased fetal movement occur. Patient expresses understanding.       
Pt called to speak with nurse regarding coming in due to symptoms - pt is currently 34wks pregnant and started spotting at approx 5am this morning black in color. Pls call via cell phone # - no other symtoms at this time.   
[id number]